# Patient Record
Sex: FEMALE | Race: WHITE | NOT HISPANIC OR LATINO | Employment: OTHER | URBAN - METROPOLITAN AREA
[De-identification: names, ages, dates, MRNs, and addresses within clinical notes are randomized per-mention and may not be internally consistent; named-entity substitution may affect disease eponyms.]

---

## 2020-02-21 ENCOUNTER — OFFICE VISIT (OUTPATIENT)
Dept: NEUROSURGERY | Facility: CLINIC | Age: 60
End: 2020-02-21

## 2020-02-21 ENCOUNTER — PREP FOR SURGERY (OUTPATIENT)
Dept: OTHER | Facility: HOSPITAL | Age: 60
End: 2020-02-21

## 2020-02-21 VITALS — HEIGHT: 66 IN | WEIGHT: 238 LBS | TEMPERATURE: 97.9 F | BODY MASS INDEX: 38.25 KG/M2

## 2020-02-21 DIAGNOSIS — M50.10 HERNIATION OF CERVICAL INTERVERTEBRAL DISC WITH RADICULOPATHY: Primary | ICD-10-CM

## 2020-02-21 DIAGNOSIS — M25.512 ACUTE PAIN OF LEFT SHOULDER: ICD-10-CM

## 2020-02-21 DIAGNOSIS — M50.00 HERNIATED NUCLEUS PULPOSUS WITH MYELOPATHY, CERVICAL: Primary | ICD-10-CM

## 2020-02-21 DIAGNOSIS — M54.12 CERVICAL RADICULOPATHY: ICD-10-CM

## 2020-02-21 PROCEDURE — 99204 OFFICE O/P NEW MOD 45 MIN: CPT | Performed by: NEUROLOGICAL SURGERY

## 2020-02-21 RX ORDER — NAPROXEN 375 MG/1
375 TABLET ORAL 2 TIMES DAILY PRN
COMMUNITY

## 2020-02-21 RX ORDER — HYDROCODONE BITARTRATE AND ACETAMINOPHEN 10; 325 MG/1; MG/1
1 TABLET ORAL 3 TIMES DAILY PRN
Qty: 45 TABLET | Refills: 0 | Status: ON HOLD | OUTPATIENT
Start: 2020-02-21 | End: 2020-03-09 | Stop reason: SDUPTHER

## 2020-02-21 RX ORDER — PROMETHAZINE HYDROCHLORIDE 25 MG/1
25 TABLET ORAL EVERY 6 HOURS PRN
COMMUNITY

## 2020-02-21 RX ORDER — METHOCARBAMOL 500 MG/1
500 TABLET, FILM COATED ORAL EVERY 6 HOURS PRN
COMMUNITY
End: 2020-03-31

## 2020-02-21 NOTE — H&P
Patient: Medina Rider  : 1960     Primary Care Provider: Susie Reeder MD     Requesting Provider: As above           History     Chief Complaint: Neck and left upper extremity pain with sensory alteration.     History of Present Illness: Ms. Rider is a 59-year-old disabled  who describes a 1 year history of neck pain.  At one point she was told she had arthritis in her neck.  At one point she had a injection in her left shoulder for tendinitis and that was helpful last year.  She has had increasing neck stiffness.  She has developed increasing pain that involves her neck and her left shoulder blade and predominantly the left shoulder.  Pain will extend down the arm.  Turning her head to the right will send numbness and pain all the way down to her hand.  She has less bothersome symptoms in her right arm.  Placing her left arm overhead is helpful.  She is worse with lifting.  She denies bowel or bladder dysfunction.     Review of Systems   Constitutional: Negative for activity change, appetite change, chills, diaphoresis, fatigue, fever and unexpected weight change.   HENT: Negative for congestion, dental problem, drooling, ear discharge, ear pain, facial swelling, hearing loss, mouth sores, nosebleeds, postnasal drip, rhinorrhea, sinus pressure, sinus pain, sneezing, sore throat, tinnitus, trouble swallowing and voice change.    Eyes: Negative for photophobia, pain, discharge, redness, itching and visual disturbance.   Respiratory: Positive for apnea. Negative for cough, choking, chest tightness, shortness of breath, wheezing and stridor.    Cardiovascular: Negative for chest pain, palpitations and leg swelling.   Gastrointestinal: Negative for abdominal distention, abdominal pain, anal bleeding, blood in stool, constipation, diarrhea, nausea, rectal pain and vomiting.   Endocrine: Negative for cold intolerance, heat intolerance, polydipsia, polyphagia and polyuria.    Genitourinary: Negative for decreased urine volume, difficulty urinating, dyspareunia, dysuria, enuresis, flank pain, frequency, genital sores, hematuria, menstrual problem, pelvic pain, urgency, vaginal bleeding, vaginal discharge and vaginal pain.   Musculoskeletal: Positive for arthralgias, back pain, myalgias, neck pain and neck stiffness. Negative for gait problem and joint swelling.   Skin: Negative for color change, pallor, rash and wound.   Allergic/Immunologic: Negative for environmental allergies, food allergies and immunocompromised state.   Neurological: Positive for dizziness, weakness, numbness and headaches. Negative for tremors, seizures, syncope, facial asymmetry, speech difficulty and light-headedness.   Hematological: Negative for adenopathy. Does not bruise/bleed easily.   Psychiatric/Behavioral: Negative for agitation, behavioral problems, confusion, decreased concentration, dysphoric mood, hallucinations, self-injury, sleep disturbance and suicidal ideas. The patient is not nervous/anxious and is not hyperactive.          The patient's past medical history, past surgical history, family history, and social history have been reviewed at length in the electronic medical record.     Past Medical History:   Diagnosis Date   • Arthritis    • Bipolar 1 disorder (CMS/HCC)    • Depression    • Diabetes mellitus (CMS/HCC)    • Disease of thyroid gland    • GERD (gastroesophageal reflux disease)    • Hyperlipidemia    • Hypertension    • Kidney stone    • Pneumonia      Past Surgical History:   Procedure Laterality Date   • APPENDECTOMY     •  SECTION     • CHOLECYSTECTOMY     • HYSTERECTOMY       Family History   Problem Relation Age of Onset   • Heart disease Mother    • Diabetes Father    • Heart disease Father    • Heart disease Sister    • Cancer Brother      Social History     Socioeconomic History   • Marital status:      Spouse name: Not on file   • Number of children: Not on  file   • Years of education: Not on file   • Highest education level: Not on file   Tobacco Use   • Smoking status: Never Smoker   • Smokeless tobacco: Never Used   Substance and Sexual Activity   • Alcohol use: No   • Drug use: No   • Sexual activity: Defer       Allergies   Allergen Reactions   • Augmentin [Amoxicillin-Pot Clavulanate] Nausea And Vomiting   • Sulfa Antibiotics        Current Outpatient Medications on File Prior to Visit   Medication Sig Dispense Refill   • ALPRAZolam (XANAX) 1 MG tablet Take 1 mg by mouth 3 (three) times a day as needed for anxiety.     • aspirin 81 MG chewable tablet Chew 81 mg daily.     • estradiol (ESTRACE) 1 MG tablet Take 1 mg by mouth daily.     • famotidine (PEPCID) 10 MG tablet Take 10 mg by mouth 2 (two) times a day.     • gabapentin (NEURONTIN) 800 MG tablet Take 800 mg by mouth 3 (three) times a day.     • HYDROcodone-acetaminophen (NORCO) 7.5-325 MG per tablet Take 1 tablet by mouth every 8 (eight) hours as needed for moderate pain (4-6).     • insulin glargine (LANTUS) 100 UNIT/ML injection Inject 40 Units under the skin every night.     • Insulin Lispro (HUMALOG SC) Inject 80 mg under the skin into the appropriate area as directed Daily.     • lisinopril (PRINIVIL,ZESTRIL) 20 MG tablet Take 20 mg by mouth daily.     • metFORMIN (GLUCOPHAGE) 1000 MG tablet Take 1,000 mg by mouth 2 (two) times a day with meals.     • methocarbamol (ROBAXIN) 500 MG tablet Take 500 mg by mouth 4 (Four) Times a Day.     • naproxen (NAPROSYN) 375 MG tablet Take 375 mg by mouth 2 (Two) Times a Day As Needed for Mild Pain .     • nitrofurantoin (MACRODANTIN) 50 MG capsule Take 50 mg by mouth every night.     • promethazine (PHENERGAN) 25 MG tablet Take 25 mg by mouth Every 6 (Six) Hours As Needed for Nausea or Vomiting.     • sitaGLIPtin (JANUVIA) 100 MG tablet Take 100 mg by mouth daily. Unsure of MG     • venlafaxine XR (EFFEXOR-XR) 150 MG 24 hr capsule Take 150 mg by mouth 2 (two) times  "a day.     • [DISCONTINUED] glipiZIDE (GLUCOTROL) 5 MG tablet Take 5 mg by mouth 2 (two) times a day before meals. UNSURE OF MG     • [DISCONTINUED] HYDROcodone-acetaminophen (NORCO) 5-325 MG per tablet Take 1 tablet by mouth every 6 (six) hours as needed for moderate pain (4-6). 15 tablet 0     No current facility-administered medications on file prior to visit.         Physical Exam:   Temp 97.9 °F (36.6 °C)   Ht 167.6 cm (66\")   Wt 108 kg (238 lb)   BMI 38.41 kg/m²   CONSTITUTIONAL: Patient is well-nourished, pleasant and appears stated age.  CV: Heart regular rate and rhythm without murmur, rub, or gallop.  PULMONARY: Lungs are clear to ascultation.  MUSCULOSKELETAL:  Neck tenderness to palpation is not observed.   ROM in neck is limited in all directions.  She is tender to palpation in the left shoulder joint.  NEUROLOGICAL:  Orientation, memory, attention span, language function, and cognition have been examined and are intact.  Strength is intact in the upper and lower extremities to direct testing.  Muscle tone is normal throughout.  Station and gait are normal.  Sensation is diminished throughout her left upper extremity to light touch testing.  Deep tendon reflexes are 2+ and symmetrical.  Dana's Sign is negative bilaterally. No clonus is elicited.  Coordination is intact.  CRANIAL NERVES:  Cranial Nerve II:  Visual fields are full to confrontation.  Cranial Nerve III, IV, and VI: PERRLADC. Extraocular movements are intact.  Nystagmus is not present.  Cranial Nerve V: Facial sensation is intact to light touch.  Cranial Nerve VII: Muscles of facial expression demonstate no weakness or asymmetry.  Cranial Nerve VIII: Hearing is intact to finger rub bilaterally.  Cranial Nerve IX and X: Palate elevates symmetrically.  Cranial Nerve XI: Shoulder shrug is intact bilaterally.  Cranial Nerve XII: Tongue is midline without evidence of atrophy or fasciculation.        Medical Decision Making     Data Review: "   MRI of the cervical spine dated 2/13/2020 demonstrates disc-osteophyte complexes at C5-6 and C6-7.  This is more prominent leftward at these levels.  There is melissa spinal cord compression.     Diagnosis:   Cervical radiculopathy with spinal cord compression.     Treatment Options:   Given her severe pain and the spinal cord compression I have recommended ACDF at C5-7.  This will likely help with her symptoms although it may not eradicate them.  If she continues to have significant difficulties then we may need to pursue some imaging of her left shoulder.  The nature of the procedure as well as the potential risks, complications, limitations, and alternatives to the procedure were discussed at length with the patient and the patient has agreed to proceed with surgery.          Diagnosis Plan   1. Herniated nucleus pulposus with myelopathy, cervical      2. Cervical radiculopathy      3. Acute pain of left shoulder

## 2020-02-21 NOTE — PROGRESS NOTES
Patient: Medina Rider  : 1960    Primary Care Provider: Susie Reeder MD    Requesting Provider: As above        History    Chief Complaint: Neck and left upper extremity pain with sensory alteration.    History of Present Illness: Ms. Rider is a 59-year-old disabled  who describes a 1 year history of neck pain.  At one point she was told she had arthritis in her neck.  At one point she had a injection in her left shoulder for tendinitis and that was helpful last year.  She has had increasing neck stiffness.  She has developed increasing pain that involves her neck and her left shoulder blade and predominantly the left shoulder.  Pain will extend down the arm.  Turning her head to the right will send numbness and pain all the way down to her hand.  She has less bothersome symptoms in her right arm.  Placing her left arm overhead is helpful.  She is worse with lifting.  She denies bowel or bladder dysfunction.    Review of Systems   Constitutional: Negative for activity change, appetite change, chills, diaphoresis, fatigue, fever and unexpected weight change.   HENT: Negative for congestion, dental problem, drooling, ear discharge, ear pain, facial swelling, hearing loss, mouth sores, nosebleeds, postnasal drip, rhinorrhea, sinus pressure, sinus pain, sneezing, sore throat, tinnitus, trouble swallowing and voice change.    Eyes: Negative for photophobia, pain, discharge, redness, itching and visual disturbance.   Respiratory: Positive for apnea. Negative for cough, choking, chest tightness, shortness of breath, wheezing and stridor.    Cardiovascular: Negative for chest pain, palpitations and leg swelling.   Gastrointestinal: Negative for abdominal distention, abdominal pain, anal bleeding, blood in stool, constipation, diarrhea, nausea, rectal pain and vomiting.   Endocrine: Negative for cold intolerance, heat intolerance, polydipsia, polyphagia and polyuria.   Genitourinary:  "Negative for decreased urine volume, difficulty urinating, dyspareunia, dysuria, enuresis, flank pain, frequency, genital sores, hematuria, menstrual problem, pelvic pain, urgency, vaginal bleeding, vaginal discharge and vaginal pain.   Musculoskeletal: Positive for arthralgias, back pain, myalgias, neck pain and neck stiffness. Negative for gait problem and joint swelling.   Skin: Negative for color change, pallor, rash and wound.   Allergic/Immunologic: Negative for environmental allergies, food allergies and immunocompromised state.   Neurological: Positive for dizziness, weakness, numbness and headaches. Negative for tremors, seizures, syncope, facial asymmetry, speech difficulty and light-headedness.   Hematological: Negative for adenopathy. Does not bruise/bleed easily.   Psychiatric/Behavioral: Negative for agitation, behavioral problems, confusion, decreased concentration, dysphoric mood, hallucinations, self-injury, sleep disturbance and suicidal ideas. The patient is not nervous/anxious and is not hyperactive.        The patient's past medical history, past surgical history, family history, and social history have been reviewed at length in the electronic medical record.    Physical Exam:   Temp 97.9 °F (36.6 °C)   Ht 167.6 cm (66\")   Wt 108 kg (238 lb)   BMI 38.41 kg/m²   CONSTITUTIONAL: Patient is well-nourished, pleasant and appears stated age.  CV: Heart regular rate and rhythm without murmur, rub, or gallop.  PULMONARY: Lungs are clear to ascultation.  MUSCULOSKELETAL:  Neck tenderness to palpation is not observed.   ROM in neck is limited in all directions.  She is tender to palpation in the left shoulder joint.  NEUROLOGICAL:  Orientation, memory, attention span, language function, and cognition have been examined and are intact.  Strength is intact in the upper and lower extremities to direct testing.  Muscle tone is normal throughout.  Station and gait are normal.  Sensation is diminished " throughout her left upper extremity to light touch testing.  Deep tendon reflexes are 2+ and symmetrical.  Dana's Sign is negative bilaterally. No clonus is elicited.  Coordination is intact.  CRANIAL NERVES:  Cranial Nerve II:  Visual fields are full to confrontation.  Cranial Nerve III, IV, and VI: PERRLADC. Extraocular movements are intact.  Nystagmus is not present.  Cranial Nerve V: Facial sensation is intact to light touch.  Cranial Nerve VII: Muscles of facial expression demonstate no weakness or asymmetry.  Cranial Nerve VIII: Hearing is intact to finger rub bilaterally.  Cranial Nerve IX and X: Palate elevates symmetrically.  Cranial Nerve XI: Shoulder shrug is intact bilaterally.  Cranial Nerve XII: Tongue is midline without evidence of atrophy or fasciculation.      Medical Decision Making    Data Review:   MRI of the cervical spine dated 2/13/2020 demonstrates disc-osteophyte complexes at C5-6 and C6-7.  This is more prominent leftward at these levels.  There is melissa spinal cord compression.    Diagnosis:   Cervical radiculopathy with spinal cord compression.    Treatment Options:   Given her severe pain and the spinal cord compression I have recommended ACDF at C5-7.  This will likely help with her symptoms although it may not eradicate them.  If she continues to have significant difficulties then we may need to pursue some imaging of her left shoulder.  The nature of the procedure as well as the potential risks, complications, limitations, and alternatives to the procedure were discussed at length with the patient and the patient has agreed to proceed with surgery.       Diagnosis Plan   1. Herniated nucleus pulposus with myelopathy, cervical     2. Cervical radiculopathy     3. Acute pain of left shoulder         Scribed for Jairo White MD by Adrianna Romeo CMA on 02/21/2020 at 3:27 PM      I, Dr. White, personally performed the services described in the documentation, as scribed in  my presence, and it is both accurate and complete.

## 2020-03-03 ENCOUNTER — APPOINTMENT (OUTPATIENT)
Dept: PREADMISSION TESTING | Facility: HOSPITAL | Age: 60
End: 2020-03-03

## 2020-03-03 VITALS — HEIGHT: 66 IN | BODY MASS INDEX: 37.35 KG/M2 | WEIGHT: 232.4 LBS

## 2020-03-03 LAB
DEPRECATED RDW RBC AUTO: 46.7 FL (ref 37–54)
ERYTHROCYTE [DISTWIDTH] IN BLOOD BY AUTOMATED COUNT: 13.1 % (ref 12.3–15.4)
HBA1C MFR BLD: 8.3 % (ref 4.8–5.6)
HCT VFR BLD AUTO: 42 % (ref 34–46.6)
HGB BLD-MCNC: 13.4 G/DL (ref 12–15.9)
MCH RBC QN AUTO: 30.7 PG (ref 26.6–33)
MCHC RBC AUTO-ENTMCNC: 31.9 G/DL (ref 31.5–35.7)
MCV RBC AUTO: 96.1 FL (ref 79–97)
PLATELET # BLD AUTO: 305 10*3/MM3 (ref 140–450)
PMV BLD AUTO: 11 FL (ref 6–12)
RBC # BLD AUTO: 4.37 10*6/MM3 (ref 3.77–5.28)
WBC NRBC COR # BLD: 9.72 10*3/MM3 (ref 3.4–10.8)

## 2020-03-03 PROCEDURE — 83036 HEMOGLOBIN GLYCOSYLATED A1C: CPT | Performed by: ANESTHESIOLOGY

## 2020-03-03 PROCEDURE — 85027 COMPLETE CBC AUTOMATED: CPT | Performed by: ANESTHESIOLOGY

## 2020-03-03 PROCEDURE — 93010 ELECTROCARDIOGRAM REPORT: CPT | Performed by: INTERNAL MEDICINE

## 2020-03-03 PROCEDURE — 87081 CULTURE SCREEN ONLY: CPT | Performed by: ANESTHESIOLOGY

## 2020-03-03 PROCEDURE — 93005 ELECTROCARDIOGRAM TRACING: CPT

## 2020-03-03 PROCEDURE — 36415 COLL VENOUS BLD VENIPUNCTURE: CPT

## 2020-03-03 RX ORDER — EZETIMIBE 10 MG/1
10 TABLET ORAL DAILY
COMMUNITY

## 2020-03-03 RX ORDER — ACETAMINOPHEN 500 MG
500 TABLET ORAL EVERY 6 HOURS PRN
COMMUNITY

## 2020-03-03 NOTE — PAT
Bactroban and Chlorhexidine Prescription given during PAT visit, as well as written and verbal instructions given to patient during PAT visit.  Patient/family also instructed to complete skin prep checklist and return the checklist on the day of surgery to preoperative staff.  Patient/family verbalized understanding.    Patient to apply Chlorhexadine wipes  to surgical area (as instructed) the night before procedure and the AM of procedure. Wipes provided.    Patient instructed to drink 20 ounces (or until full) of Gatorade and it needs to be completed 1 hour before given arrival time for procedure (NO RED Gatorade)    Patient verbalized understanding.    Per Anesthesia Request, patient instructed not to take their ACE/ARB medications on the AM of surgery.

## 2020-03-05 LAB — MRSA SPEC QL CULT: NORMAL

## 2020-03-09 ENCOUNTER — ANESTHESIA (OUTPATIENT)
Dept: PERIOP | Facility: HOSPITAL | Age: 60
End: 2020-03-09

## 2020-03-09 ENCOUNTER — HOSPITAL ENCOUNTER (OUTPATIENT)
Facility: HOSPITAL | Age: 60
Discharge: HOME | End: 2020-03-09
Attending: NEUROLOGICAL SURGERY | Admitting: NEUROLOGICAL SURGERY

## 2020-03-09 ENCOUNTER — APPOINTMENT (OUTPATIENT)
Dept: GENERAL RADIOLOGY | Facility: HOSPITAL | Age: 60
End: 2020-03-09

## 2020-03-09 ENCOUNTER — ANESTHESIA EVENT (OUTPATIENT)
Dept: PERIOP | Facility: HOSPITAL | Age: 60
End: 2020-03-09

## 2020-03-09 VITALS
DIASTOLIC BLOOD PRESSURE: 82 MMHG | OXYGEN SATURATION: 93 % | SYSTOLIC BLOOD PRESSURE: 168 MMHG | TEMPERATURE: 97.8 F | RESPIRATION RATE: 18 BRPM | HEART RATE: 107 BPM

## 2020-03-09 DIAGNOSIS — M50.10 HERNIATION OF CERVICAL INTERVERTEBRAL DISC WITH RADICULOPATHY: ICD-10-CM

## 2020-03-09 DIAGNOSIS — M50.00 HERNIATED NUCLEUS PULPOSUS WITH MYELOPATHY, CERVICAL: ICD-10-CM

## 2020-03-09 LAB
GLUCOSE BLDC GLUCOMTR-MCNC: 130 MG/DL (ref 70–130)
GLUCOSE BLDC GLUCOMTR-MCNC: 224 MG/DL (ref 70–130)
GLUCOSE BLDC GLUCOMTR-MCNC: 91 MG/DL (ref 70–130)

## 2020-03-09 PROCEDURE — 25010000002 PHENYLEPHRINE PER 1 ML: Performed by: NURSE ANESTHETIST, CERTIFIED REGISTERED

## 2020-03-09 PROCEDURE — 20931 SP BONE ALGRFT STRUCT ADD-ON: CPT | Performed by: NEUROLOGICAL SURGERY

## 2020-03-09 PROCEDURE — C1713 ANCHOR/SCREW BN/BN,TIS/BN: HCPCS | Performed by: NEUROLOGICAL SURGERY

## 2020-03-09 PROCEDURE — 25010000002 MIDAZOLAM PER 1 MG: Performed by: NURSE ANESTHETIST, CERTIFIED REGISTERED

## 2020-03-09 PROCEDURE — 63710000001 HYDROCODONE-ACETAMINOPHEN 10-325 MG TABLET: Performed by: NEUROLOGICAL SURGERY

## 2020-03-09 PROCEDURE — 22551 ARTHRD ANT NTRBDY CERVICAL: CPT | Performed by: NEUROLOGICAL SURGERY

## 2020-03-09 PROCEDURE — 76000 FLUOROSCOPY <1 HR PHYS/QHP: CPT

## 2020-03-09 PROCEDURE — 25010000002 PROPOFOL 10 MG/ML EMULSION: Performed by: NURSE ANESTHETIST, CERTIFIED REGISTERED

## 2020-03-09 PROCEDURE — 25010000002 ONDANSETRON PER 1 MG: Performed by: NURSE ANESTHETIST, CERTIFIED REGISTERED

## 2020-03-09 PROCEDURE — 82962 GLUCOSE BLOOD TEST: CPT

## 2020-03-09 PROCEDURE — 22845 INSERT SPINE FIXATION DEVICE: CPT | Performed by: NEUROLOGICAL SURGERY

## 2020-03-09 PROCEDURE — 22551 ARTHRD ANT NTRBDY CERVICAL: CPT | Performed by: PHYSICIAN ASSISTANT

## 2020-03-09 PROCEDURE — A9270 NON-COVERED ITEM OR SERVICE: HCPCS | Performed by: NEUROLOGICAL SURGERY

## 2020-03-09 PROCEDURE — 25010000002 NEOSTIGMINE 10 MG/10ML SOLUTION: Performed by: NURSE ANESTHETIST, CERTIFIED REGISTERED

## 2020-03-09 PROCEDURE — 25010000002 VANCOMYCIN: Performed by: NEUROLOGICAL SURGERY

## 2020-03-09 PROCEDURE — 25010000002 FENTANYL CITRATE (PF) 100 MCG/2ML SOLUTION: Performed by: NURSE ANESTHETIST, CERTIFIED REGISTERED

## 2020-03-09 PROCEDURE — 22845 INSERT SPINE FIXATION DEVICE: CPT | Performed by: PHYSICIAN ASSISTANT

## 2020-03-09 PROCEDURE — 25010000002 VANCOMYCIN 10 G RECONSTITUTED SOLUTION: Performed by: NEUROLOGICAL SURGERY

## 2020-03-09 PROCEDURE — 25010000002 DEXAMETHASONE SOD PHOS-NACL 10-0.9 MG/50ML-% SOLUTION: Performed by: NEUROLOGICAL SURGERY

## 2020-03-09 PROCEDURE — 25010000002 HYDROMORPHONE 1 MG/ML SOLUTION: Performed by: NURSE ANESTHETIST, CERTIFIED REGISTERED

## 2020-03-09 PROCEDURE — 22552 ARTHRD ANT NTRBD CERVICAL EA: CPT | Performed by: NEUROLOGICAL SURGERY

## 2020-03-09 PROCEDURE — 22552 ARTHRD ANT NTRBD CERVICAL EA: CPT | Performed by: PHYSICIAN ASSISTANT

## 2020-03-09 DEVICE — BONE LORDOTIC ASR 6X14X11 FZD: Type: IMPLANTABLE DEVICE | Site: SPINE CERVICAL | Status: FUNCTIONAL

## 2020-03-09 DEVICE — SCREW 7723513 ZEVO VAR ST 3.5MM X 13MM
Type: IMPLANTABLE DEVICE | Status: FUNCTIONAL
Brand: ZEVO™ ANTERIOR CERVICAL PLATE SYSTEM

## 2020-03-09 DEVICE — BONE LORDOTIC ASR 5X14X11 FZD: Type: IMPLANTABLE DEVICE | Status: FUNCTIONAL

## 2020-03-09 DEVICE — PLATE 3002035 ZEVO 35MM 2 LVL
Type: IMPLANTABLE DEVICE | Status: FUNCTIONAL
Brand: ZEVO™ ANTERIOR CERVICAL PLATE SYSTEM

## 2020-03-09 RX ORDER — HYDROCODONE BITARTRATE AND ACETAMINOPHEN 10; 325 MG/1; MG/1
1 TABLET ORAL 3 TIMES DAILY PRN
Qty: 20 TABLET | Refills: 0 | Status: SHIPPED | OUTPATIENT
Start: 2020-03-09 | End: 2021-07-27

## 2020-03-09 RX ORDER — PROMETHAZINE HYDROCHLORIDE 25 MG/1
25 SUPPOSITORY RECTAL ONCE AS NEEDED
Status: DISCONTINUED | OUTPATIENT
Start: 2020-03-09 | End: 2020-03-09 | Stop reason: HOSPADM

## 2020-03-09 RX ORDER — FENTANYL CITRATE 50 UG/ML
50 INJECTION, SOLUTION INTRAMUSCULAR; INTRAVENOUS
Status: DISCONTINUED | OUTPATIENT
Start: 2020-03-09 | End: 2020-03-09 | Stop reason: HOSPADM

## 2020-03-09 RX ORDER — PROMETHAZINE HYDROCHLORIDE 25 MG/1
25 TABLET ORAL ONCE AS NEEDED
Status: DISCONTINUED | OUTPATIENT
Start: 2020-03-09 | End: 2020-03-09 | Stop reason: HOSPADM

## 2020-03-09 RX ORDER — SODIUM CHLORIDE, SODIUM LACTATE, POTASSIUM CHLORIDE, CALCIUM CHLORIDE 600; 310; 30; 20 MG/100ML; MG/100ML; MG/100ML; MG/100ML
9 INJECTION, SOLUTION INTRAVENOUS CONTINUOUS
Status: DISCONTINUED | OUTPATIENT
Start: 2020-03-09 | End: 2020-03-09 | Stop reason: HOSPADM

## 2020-03-09 RX ORDER — LIDOCAINE HYDROCHLORIDE 10 MG/ML
INJECTION, SOLUTION EPIDURAL; INFILTRATION; INTRACAUDAL; PERINEURAL AS NEEDED
Status: DISCONTINUED | OUTPATIENT
Start: 2020-03-09 | End: 2020-03-09 | Stop reason: SURG

## 2020-03-09 RX ORDER — SODIUM CHLORIDE 0.9 % (FLUSH) 0.9 %
10 SYRINGE (ML) INJECTION EVERY 12 HOURS SCHEDULED
Status: CANCELLED | OUTPATIENT
Start: 2020-03-09

## 2020-03-09 RX ORDER — FAMOTIDINE 10 MG/ML
20 INJECTION, SOLUTION INTRAVENOUS ONCE
Status: CANCELLED | OUTPATIENT
Start: 2020-03-09 | End: 2020-03-09

## 2020-03-09 RX ORDER — SODIUM CHLORIDE 9 MG/ML
INJECTION, SOLUTION INTRAVENOUS AS NEEDED
Status: DISCONTINUED | OUTPATIENT
Start: 2020-03-09 | End: 2020-03-09 | Stop reason: HOSPADM

## 2020-03-09 RX ORDER — MIDAZOLAM HYDROCHLORIDE 1 MG/ML
INJECTION INTRAMUSCULAR; INTRAVENOUS AS NEEDED
Status: DISCONTINUED | OUTPATIENT
Start: 2020-03-09 | End: 2020-03-09 | Stop reason: SURG

## 2020-03-09 RX ORDER — FENTANYL CITRATE 50 UG/ML
INJECTION, SOLUTION INTRAMUSCULAR; INTRAVENOUS AS NEEDED
Status: DISCONTINUED | OUTPATIENT
Start: 2020-03-09 | End: 2020-03-09 | Stop reason: SURG

## 2020-03-09 RX ORDER — PROMETHAZINE HYDROCHLORIDE 25 MG/ML
12.5 INJECTION, SOLUTION INTRAMUSCULAR; INTRAVENOUS ONCE AS NEEDED
Status: DISCONTINUED | OUTPATIENT
Start: 2020-03-09 | End: 2020-03-09 | Stop reason: HOSPADM

## 2020-03-09 RX ORDER — NEOSTIGMINE METHYLSULFATE 1 MG/ML
INJECTION, SOLUTION INTRAVENOUS AS NEEDED
Status: DISCONTINUED | OUTPATIENT
Start: 2020-03-09 | End: 2020-03-09 | Stop reason: SURG

## 2020-03-09 RX ORDER — PROPOFOL 10 MG/ML
VIAL (ML) INTRAVENOUS AS NEEDED
Status: DISCONTINUED | OUTPATIENT
Start: 2020-03-09 | End: 2020-03-09 | Stop reason: SURG

## 2020-03-09 RX ORDER — SODIUM CHLORIDE 0.9 % (FLUSH) 0.9 %
10 SYRINGE (ML) INJECTION AS NEEDED
Status: CANCELLED | OUTPATIENT
Start: 2020-03-09

## 2020-03-09 RX ORDER — ROCURONIUM BROMIDE 10 MG/ML
INJECTION, SOLUTION INTRAVENOUS AS NEEDED
Status: DISCONTINUED | OUTPATIENT
Start: 2020-03-09 | End: 2020-03-09 | Stop reason: SURG

## 2020-03-09 RX ORDER — MAGNESIUM HYDROXIDE 1200 MG/15ML
LIQUID ORAL AS NEEDED
Status: DISCONTINUED | OUTPATIENT
Start: 2020-03-09 | End: 2020-03-09 | Stop reason: HOSPADM

## 2020-03-09 RX ORDER — FAMOTIDINE 20 MG/1
20 TABLET, FILM COATED ORAL ONCE
Status: COMPLETED | OUTPATIENT
Start: 2020-03-09 | End: 2020-03-09

## 2020-03-09 RX ORDER — LIDOCAINE HYDROCHLORIDE 10 MG/ML
0.5 INJECTION, SOLUTION EPIDURAL; INFILTRATION; INTRACAUDAL; PERINEURAL ONCE AS NEEDED
Status: COMPLETED | OUTPATIENT
Start: 2020-03-09 | End: 2020-03-09

## 2020-03-09 RX ORDER — GLYCOPYRROLATE 0.2 MG/ML
INJECTION INTRAMUSCULAR; INTRAVENOUS AS NEEDED
Status: DISCONTINUED | OUTPATIENT
Start: 2020-03-09 | End: 2020-03-09 | Stop reason: SURG

## 2020-03-09 RX ORDER — DEXAMETHASONE IN 0.9 % SOD CHL 10 MG/50ML
10 INTRAVENOUS SOLUTION, PIGGYBACK (ML) INTRAVENOUS
Status: COMPLETED | OUTPATIENT
Start: 2020-03-09 | End: 2020-03-09

## 2020-03-09 RX ORDER — ONDANSETRON 2 MG/ML
INJECTION INTRAMUSCULAR; INTRAVENOUS AS NEEDED
Status: DISCONTINUED | OUTPATIENT
Start: 2020-03-09 | End: 2020-03-09 | Stop reason: SURG

## 2020-03-09 RX ORDER — ONDANSETRON 2 MG/ML
4 INJECTION INTRAMUSCULAR; INTRAVENOUS ONCE AS NEEDED
Status: DISCONTINUED | OUTPATIENT
Start: 2020-03-09 | End: 2020-03-09 | Stop reason: HOSPADM

## 2020-03-09 RX ORDER — HYDROCODONE BITARTRATE AND ACETAMINOPHEN 10; 325 MG/1; MG/1
1 TABLET ORAL ONCE
Status: COMPLETED | OUTPATIENT
Start: 2020-03-09 | End: 2020-03-09

## 2020-03-09 RX ADMIN — DEXAMETHASONE SODIUM PHOSPHATE 10 MG: 10 INJECTION INTRAMUSCULAR; INTRAVENOUS at 07:07

## 2020-03-09 RX ADMIN — FAMOTIDINE 20 MG: 20 TABLET ORAL at 06:20

## 2020-03-09 RX ADMIN — ONDANSETRON 4 MG: 2 INJECTION INTRAMUSCULAR; INTRAVENOUS at 09:46

## 2020-03-09 RX ADMIN — SODIUM CHLORIDE, POTASSIUM CHLORIDE, SODIUM LACTATE AND CALCIUM CHLORIDE 9 ML/HR: 600; 310; 30; 20 INJECTION, SOLUTION INTRAVENOUS at 06:05

## 2020-03-09 RX ADMIN — EPHEDRINE SULFATE 5 MG: 50 INJECTION INTRAMUSCULAR; INTRAVENOUS; SUBCUTANEOUS at 07:35

## 2020-03-09 RX ADMIN — ROCURONIUM BROMIDE 10 MG: 10 INJECTION INTRAVENOUS at 09:01

## 2020-03-09 RX ADMIN — LIDOCAINE HYDROCHLORIDE 50 MG: 10 INJECTION, SOLUTION EPIDURAL; INFILTRATION; INTRACAUDAL; PERINEURAL at 07:01

## 2020-03-09 RX ADMIN — FENTANYL CITRATE 50 MCG: 50 INJECTION INTRAMUSCULAR; INTRAVENOUS at 10:18

## 2020-03-09 RX ADMIN — HYDROCODONE BITARTRATE AND ACETAMINOPHEN 1 TABLET: 10; 325 TABLET ORAL at 11:56

## 2020-03-09 RX ADMIN — NEOSTIGMINE 2.5 MG: 1 INJECTION INTRAVENOUS at 09:46

## 2020-03-09 RX ADMIN — ROCURONIUM BROMIDE 10 MG: 10 INJECTION INTRAVENOUS at 07:30

## 2020-03-09 RX ADMIN — FENTANYL CITRATE 100 MCG: 50 INJECTION, SOLUTION INTRAMUSCULAR; INTRAVENOUS at 07:01

## 2020-03-09 RX ADMIN — FENTANYL CITRATE 50 MCG: 50 INJECTION INTRAMUSCULAR; INTRAVENOUS at 11:04

## 2020-03-09 RX ADMIN — HYDROMORPHONE HYDROCHLORIDE 0.5 MG: 1 INJECTION, SOLUTION INTRAMUSCULAR; INTRAVENOUS; SUBCUTANEOUS at 10:35

## 2020-03-09 RX ADMIN — HYDROMORPHONE HYDROCHLORIDE 0.5 MG: 1 INJECTION, SOLUTION INTRAMUSCULAR; INTRAVENOUS; SUBCUTANEOUS at 10:48

## 2020-03-09 RX ADMIN — FENTANYL CITRATE 50 MCG: 50 INJECTION INTRAMUSCULAR; INTRAVENOUS at 10:56

## 2020-03-09 RX ADMIN — EPHEDRINE SULFATE 10 MG: 50 INJECTION INTRAMUSCULAR; INTRAVENOUS; SUBCUTANEOUS at 07:45

## 2020-03-09 RX ADMIN — VANCOMYCIN HYDROCHLORIDE 1.5 G: 10 INJECTION, POWDER, LYOPHILIZED, FOR SOLUTION INTRAVENOUS at 06:56

## 2020-03-09 RX ADMIN — ROCURONIUM BROMIDE 10 MG: 10 INJECTION INTRAVENOUS at 08:30

## 2020-03-09 RX ADMIN — GLYCOPYRROLATE 0.4 MG: 0.2 INJECTION INTRAMUSCULAR; INTRAVENOUS at 09:46

## 2020-03-09 RX ADMIN — MIDAZOLAM 2 MG: 1 INJECTION INTRAMUSCULAR; INTRAVENOUS at 06:56

## 2020-03-09 RX ADMIN — PROPOFOL 150 MG: 10 INJECTION, EMULSION INTRAVENOUS at 07:01

## 2020-03-09 RX ADMIN — ROCURONIUM BROMIDE 40 MG: 10 INJECTION INTRAVENOUS at 07:01

## 2020-03-09 RX ADMIN — SODIUM CHLORIDE, POTASSIUM CHLORIDE, SODIUM LACTATE AND CALCIUM CHLORIDE: 600; 310; 30; 20 INJECTION, SOLUTION INTRAVENOUS at 09:01

## 2020-03-09 RX ADMIN — FENTANYL CITRATE 50 MCG: 50 INJECTION INTRAMUSCULAR; INTRAVENOUS at 10:25

## 2020-03-09 RX ADMIN — PHENYLEPHRINE HYDROCHLORIDE 0.5 MCG/KG/MIN: 10 INJECTION INTRAVENOUS at 08:10

## 2020-03-09 RX ADMIN — VANCOMYCIN HYDROCHLORIDE 1500 MG: 10 INJECTION, POWDER, LYOPHILIZED, FOR SOLUTION INTRAVENOUS at 06:25

## 2020-03-09 RX ADMIN — EPHEDRINE SULFATE 10 MG: 50 INJECTION INTRAMUSCULAR; INTRAVENOUS; SUBCUTANEOUS at 07:40

## 2020-03-09 RX ADMIN — LIDOCAINE HYDROCHLORIDE 0.2 ML: 10 INJECTION, SOLUTION EPIDURAL; INFILTRATION; INTRACAUDAL; PERINEURAL at 06:05

## 2020-03-09 RX ADMIN — ROCURONIUM BROMIDE 10 MG: 10 INJECTION INTRAVENOUS at 08:00

## 2020-03-09 NOTE — PLAN OF CARE
Problem: Patient Care Overview  Goal: Plan of Care Review  Outcome: Ongoing (interventions implemented as appropriate)  Flowsheets (Taken 3/9/2020 1146)  Progress: no change  Plan of Care Reviewed With: patient  Outcome Summary: patient admitted from pacu, VSS, has voided and ambulated, will be discharged later today after tolerating oral intake and no usual neck swelling

## 2020-03-09 NOTE — ANESTHESIA PREPROCEDURE EVALUATION
Anesthesia Evaluation     NPO Solid Status: > 8 hours  NPO Liquid Status: > 4 hours           Airway   Mallampati: III  TM distance: >3 FB  Neck ROM: full  Possible difficult intubation  Dental - normal exam     Pulmonary    (+) decreased breath sounds,   (-) asthma, not a smoker  Cardiovascular     ECG reviewed  Rhythm: regular  Rate: normal    (+) hypertension,   (-) pacemaker, angina, cardiac stents, CABG      Neuro/Psych  (-) seizures, TIA, CVA  GI/Hepatic/Renal/Endo    (+)   diabetes mellitus (IDDM),   (-) liver disease, no renal disease    Musculoskeletal     Abdominal    Substance History      OB/GYN          Other            Phys Exam Other: Nearly full extension and flexion                Anesthesia Plan    ASA 3     general   (GA  Glidescope in neutral position, no lift)  intravenous induction       Plan discussed with CRNA.

## 2020-03-09 NOTE — OP NOTE
NEUROSURGICAL OPERATIVE NOTE        PREOPERATIVE DIAGNOSIS:    Cervical spondylosis with cord compression and radiculopathy      POSTOPERATIVE DIAGNOSIS:  Same      PROCEDURE:  1.  Arthrodesis at C5-6 and C6-7  2.  Anterior cervical discectomy with microdissection at C5-6 and C6-7  3.  Zevo anterior plating C5-7  4.  Composite allografting C5-6 and C6-7      SURGEON:  Jairo White M.D.      ASSISTANT: Rehana Iniguez PA-C      ANESTHESIA:  General      ESTIMATED BLOOD LOSS: Minimal      SPECIMEN: None      DRAINS: None      COMPLICATIONS:  None      CLINICAL NOTE:  The patient is a 59-year-old woman with a history of neck and bilateral upper extremity pain, left greater than right.  Studies demonstrate spondylosis with cord compromise at C5-6 and C6-7.  Given the above she presents at this time for ACDF C5-7.  The nature of the procedure as well as the potential risks, complications, limitations, and alternatives to the procedure were discussed at length with the patient and the patient has agreed to proceed with surgery.      TECHNICAL NOTE:  The patient was brought to the operating room and placed on the operating room table in the supine position. General endotracheal anesthesia was achieved. A small roll was placed under her shoulders to provide for some very mild extension of her neck. Her anterior neck was prepared and draped in usual fashion. A mildly curved incision was fashioned from the midline rightward approximately 1 fingerwidth below the level of the cricothyroid membrane. Underlying subcutaneous tissues were undermined. The platysma was divided longitudinally. A plane medial to the belly of the sternocleidomastoid muscle was pursued to provide exposure to the anterior spine. Disk space was marked. A localizing radiograph confirmed the operative level. Longus colli muscle was mobilized from the anterior spine with cautery. Self-retaining retractor provided side-to-side exposure. The disk at C6-C7  was incised and evacuated piecemeal with an array of pituitary rongeurs, Svetlana curettes, and Kerrison punches. Distraction screws were utilized. The disk space was very spondylotic. The operating microscope was brought into use. Diskectomy completed. Osteophytes and some disk protrusion were removed. The thickened posterior longitudinal ligament was taken down with Svetlana curettes and Kerrison punches. The neural foramina were widely decompressed. Endplates were decorticated and a small ledge of bone was left posteriorly on each endplate. A 6 mm lordotic composite allograft was impacted into place. The distraction screw at C7 was then placed at C5 and the entire procedure was repeated at that level. This disk space was more narrow. Some soft disc was removed but there was a large osteophyte which was mobilized. This was attached more to the ligament. A small angled probe was utilized to support the spur while angled small curettes were utilized to fracture off and release the spur for delivery and removal. The posterior longitudinal ligament was taken down once again and neural foramina were widely decompressed. At completion of the procedure, an angled micro-ball probe could easily be passed along the nerve roots into the foramina has had been the case at C6-C7. After preparing the endplates once again, a 5 mm lordotic composite allograft was impacted into place. The distraction screws were removed. Bleeding points controlled with bone wax. Anterior osteophytes resected with the drill. A 35 mm Zevo plate was then affixed to the anterior spine from C5 to C7 using 13 mm variable angle screws bilaterally at C5, C6, and C7. Locking cams were engaged at each level. The wound was washed out with an antibiotic-containing solution. Some very small bleeding points were controlled with bipolar cautery. The platysma was reapproximated in interrupted fashion with 3-0 Vicryl suture. The skin was closed in a running  subcuticular fashion with 3-0 Vicryl suture. A dermal sealant was applied. The patient was subsequently extubated and taken to recovery room in satisfactory condition. She did receive preoperative Decadron and antibiotics.            Jairo White M.D.

## 2020-03-09 NOTE — INTERVAL H&P NOTE
Pre-Op H&P (See Recent Office Note Attached for Full H&P)    Chief complaint: Neck and LUE pain with sensory alteration    Review of Systems:  General ROS:  no fever, chills, rashes, No change since last office visit  Cardiovascular ROS: no chest pain or dyspnea on exertion.  History of murmur since childhood.  Neg activity intolerance  Respiratory ROS: no cough, shortness of breath, or wheezing    Meds:    No current facility-administered medications on file prior to encounter.      Current Outpatient Medications on File Prior to Encounter   Medication Sig Dispense Refill   • estradiol (ESTRACE) 2 MG tablet Take 2 mg by mouth Daily.     • famotidine (PEPCID) 10 MG tablet Take 10 mg by mouth 2 (two) times a day.     • gabapentin (NEURONTIN) 800 MG tablet Take 800 mg by mouth 3 (three) times a day.     • insulin glargine (LANTUS) 100 UNIT/ML injection Inject 80 Units under the skin into the appropriate area as directed Every Night.     • Insulin Lispro (HUMALOG SC) Inject  under the skin into the appropriate area as directed 3 (Three) Times a Day. Per sliding scale     • metFORMIN (GLUCOPHAGE) 1000 MG tablet Take 1,000 mg by mouth 2 (two) times a day with meals.     • nitrofurantoin (MACRODANTIN) 50 MG capsule Take 50 mg by mouth every night.     • venlafaxine XR (EFFEXOR-XR) 150 MG 24 hr capsule Take 150 mg by mouth 2 (two) times a day.     • aspirin 81 MG chewable tablet Chew 81 mg daily.     • HYDROcodone-acetaminophen (NORCO) 7.5-325 MG per tablet Take 1 tablet by mouth every 8 (eight) hours as needed for moderate pain (4-6).     • lisinopril (PRINIVIL,ZESTRIL) 40 MG tablet Take 40 mg by mouth Daily.     • methocarbamol (ROBAXIN) 500 MG tablet Take 500 mg by mouth Every 6 (Six) Hours As Needed.     • naproxen (NAPROSYN) 375 MG tablet Take 375 mg by mouth 2 (Two) Times a Day As Needed for Mild Pain .     • promethazine (PHENERGAN) 25 MG tablet Take 25 mg by mouth Every 6 (Six) Hours As Needed for Nausea or  Vomiting.         Vital Signs:  /98 (BP Location: Right arm, Patient Position: Sitting)   Pulse 99   Temp 97.6 °F (36.4 °C) (Temporal)   Resp 18   SpO2 97%   Breastfeeding No     Physical Exam:    CV:  S1S2 regular rate and rhythm, 1-2/6 systolic murmur               Resp:  Clear to auscultation; respirations regular, even and unlabored    Results Review:     Lab Results   Component Value Date    WBC 9.72 03/03/2020    HGB 13.4 03/03/2020    HCT 42.0 03/03/2020    MCV 96.1 03/03/2020     03/03/2020        I reviewed the patient's new clinical results.    Cancer Staging (if applicable)  Cancer Patient: __ yes _x_no __unknown; If yes, clinical stage T:__ N:__M:__, stage group or __N/A    Assessment/Plan:    Diagnosis:   Cervical radiculopathy with spinal cord compression.     Treatment Options:   Given her severe pain and the spinal cord compression I have recommended ACDF at C5-7.  This will likely help with her symptoms although it may not eradicate them.  If she continues to have significant difficulties then we may need to pursue some imaging of her left shoulder.  The nature of the procedure as well as the potential risks, complications, limitations, and alternatives to the procedure were discussed at length with the patient and the patient has agreed to proceed with surgery.    Luciana Gipson, STEVEN  3/9/2020   6:37 AM

## 2020-03-09 NOTE — ANESTHESIA PROCEDURE NOTES
Airway  Urgency: elective    Date/Time: 3/9/2020 7:19 AM  Airway not difficult    General Information and Staff    Patient location during procedure: OR    Indications and Patient Condition  Indications for airway management: airway protection    Preoxygenated: yes  MILS not maintained throughout  Mask difficulty assessment: 1 - vent by mask    Final Airway Details  Final airway type: endotracheal airway      Successful airway: ETT  Cuffed: yes   Successful intubation technique: video laryngoscopy  Facilitating devices/methods: intubating stylet  Endotracheal tube insertion site: oral  Blade: Jud  Blade size: 3  ETT size (mm): 7.5  Cormack-Lehane Classification: grade I - full view of glottis  Placement verified by: chest auscultation and capnometry   Measured from: lips  ETT/EBT  to lips (cm): 20  Number of attempts at approach: 1  Assessment: lips, teeth, and gum same as pre-op and atraumatic intubation    Additional Comments  Pt intubated with glidescope prophylactic for neutral head position during intubation on ACDF Negative epigastric sounds, Breath sound equal bilaterally with symmetric chest rise and fall

## 2020-03-09 NOTE — ANESTHESIA POSTPROCEDURE EVALUATION
Patient: Medina Rider    Procedure Summary     Date:  03/09/20 Room / Location:   [facility] [address] [place] [address] [facility]    Anesthesia Start:  0656 Anesthesia Stop:      Procedure:  CERVICAL DISCECTOMY ANTERIOR WITH FUSION C5-7 (N/A Spine Cervical) Diagnosis:       Herniation of cervical intervertebral disc with radiculopathy      (Herniation of cervical intervertebral disc with radiculopathy [M50.10])    Surgeon:  Jairo White MD Provider:  Britney uW MD    Anesthesia Type:  general ASA Status:  3          Anesthesia Type: general    Vitals  Vitals Value Taken Time   /91 3/9/2020 10:00 AM   Temp     Pulse 113 3/9/2020 10:02 AM   Resp     SpO2 90 % 3/9/2020 10:02 AM   Vitals shown include unvalidated device data.        Post Anesthesia Care and Evaluation    Patient location during evaluation: PACU  Patient participation: complete - patient participated  Level of consciousness: awake and alert  Pain score: 0  Pain management: adequate  Airway patency: patent  Anesthetic complications: No anesthetic complications  PONV Status: none  Cardiovascular status: hemodynamically stable and acceptable  Respiratory status: nonlabored ventilation, acceptable and nasal cannula  Hydration status: acceptable    Comments: 94%, rr12, 97.1 112 hr, 161/85

## 2020-03-13 ENCOUNTER — TELEPHONE (OUTPATIENT)
Dept: NEUROSURGERY | Facility: CLINIC | Age: 60
End: 2020-03-13

## 2020-03-13 NOTE — TELEPHONE ENCOUNTER
Provider:  Christopher  Caller: patient  Time of call:   11:58  Phone #:  [phone]  Surgery:  ACDF  Surgery Date:  03/09/20  Last visit:   same  Next visit: None    JAZMÍN:         Reason for call:     Patient states that the night before last she started experiencing extreme pain in her Left shoulder and LUE.  She has been taking Norco and Naproxen with little relief.  Patient was crying.  Her incision looks good, however there is little swelling, no drainage.  She states it's the same pain she had before surgery.    New-She has right thumb pain/tingling middle and index finger.

## 2020-03-13 NOTE — TELEPHONE ENCOUNTER
Medication phoned in and patient notified.  She was thankful for the help.    She will call us on Monday with an update.

## 2020-03-16 RX ORDER — CYCLOBENZAPRINE HCL 10 MG
10 TABLET ORAL 3 TIMES DAILY PRN
Qty: 60 TABLET | Refills: 1 | Status: SHIPPED | OUTPATIENT
Start: 2020-03-16 | End: 2020-03-31 | Stop reason: SDUPTHER

## 2020-03-16 NOTE — TELEPHONE ENCOUNTER
"Patient called with an update.\" The steroids are not helping. Pain seems to be not too bad in neck, but I still have pain in my arm and it runs up into my shoulder. I have 2 days of pain medicine left.The pain is almost like before I had surgery. I cant handle it.\" She had cervical discectomy on 3-9. She gave me a phone number that is not in system. [phone] was the number she gave me.  "

## 2020-03-16 NOTE — TELEPHONE ENCOUNTER
Called patient. She states the pain in her LUE is unchanged and severe. No weakness. Incision is clean, dry and intact. I recommended she change to flexeril TID and add tylenol 1000 mg TID for breakthrough pain. She has 1-2 days left of norco. I advised her to let us know when she is out and we can call Tramadol in for her. I discussed general restrictions she should be following.

## 2020-03-23 RX ORDER — TRAMADOL HYDROCHLORIDE 50 MG/1
50 TABLET ORAL EVERY 8 HOURS PRN
Qty: 30 TABLET | Refills: 0 | OUTPATIENT
Start: 2020-03-23 | End: 2020-06-12 | Stop reason: SDUPTHER

## 2020-03-23 NOTE — TELEPHONE ENCOUNTER
Tramadol called in to pt's pharmacy. (Please sign to reflect in chart)    Called pt to advise, no answer, no option for VM

## 2020-03-23 NOTE — TELEPHONE ENCOUNTER
Provider:  Christopher  Caller: Patient   Time of call: 11:59am    Phone #:  [phone]  Surgery:   ACDF C5-7  Surgery Date:  03/09/2020  Last visit:   02/21/2020  Next visit: MARIBETH NOYOLA:         Reason for call:  Patient called LVM stating her arm is still hurting her really bad. Patient stated she completed the steroid pack, and that did not help her. Patient is wanting to know if tramadol can called in for her, to help with her pain level.

## 2020-03-27 ENCOUNTER — TELEPHONE (OUTPATIENT)
Dept: NEUROSURGERY | Facility: CLINIC | Age: 60
End: 2020-03-27

## 2020-03-27 NOTE — TELEPHONE ENCOUNTER
PT CALLING WANTS TO CX HER PO TODAY DUE TO COVID19. SHE IS AFRAID AND WOULD LIKE A CALL BACK AT THIS NUMBER [phone].  PLEASE ADVISE ME AS TO WHEN TO CINDY.  THANKS JOSE

## 2020-03-31 ENCOUNTER — TELEMEDICINE (OUTPATIENT)
Dept: NEUROSURGERY | Facility: CLINIC | Age: 60
End: 2020-03-31

## 2020-03-31 DIAGNOSIS — M50.00 HERNIATED NUCLEUS PULPOSUS WITH MYELOPATHY, CERVICAL: ICD-10-CM

## 2020-03-31 PROCEDURE — 99024 POSTOP FOLLOW-UP VISIT: CPT | Performed by: PHYSICIAN ASSISTANT

## 2020-03-31 RX ORDER — CYCLOBENZAPRINE HCL 10 MG
10 TABLET ORAL 3 TIMES DAILY PRN
Qty: 60 TABLET | Refills: 1 | Status: SHIPPED | OUTPATIENT
Start: 2020-03-31 | End: 2020-06-12 | Stop reason: SDUPTHER

## 2020-03-31 RX ORDER — HYDROCODONE BITARTRATE AND ACETAMINOPHEN 7.5; 325 MG/1; MG/1
1 TABLET ORAL EVERY 8 HOURS PRN
Qty: 25 TABLET | Refills: 0 | Status: ON HOLD | OUTPATIENT
Start: 2020-03-31 | End: 2021-06-07 | Stop reason: SDUPTHER

## 2020-03-31 NOTE — PROGRESS NOTES
Patient: Medina Rider  : 1960  Chart #: [id number]    Date of Service: 2020    CHIEF COMPLAINT: Cervical spondylosis with cord compression and radiculopathy    History of Present Illness Ms. Rider is a 59-year-old woman with a history of neck and bilateral upper extremity pain, left greater than right.  Her studies demonstrated spondylosis with cord compromise at C5-6 and C6-7.  As such on 3/9/2020 she underwent ACDF with allografting and plating C5-7.  Surgery was without overt intraoperative complication.    Today patient is 3 weeks postop.  She called our office a couple times complaining of increased pain into her arms after surgery.  A Medrol Dosepak was prescribed.  I spoke with her via video chat today and she has been doing better this week.  She does continue with some posterior neck pain that radiates into the left shoulder with some milder symptoms further distally.  She continues with some lowgrade right-sided symptoms.  She denies incisional issue.  She denies new or progressive symptoms.  She has been taking tramadol and Flexeril and has requested a refill of her [place].      Past Medical History:   Diagnosis Date   • Anemia    • Anesthesia complication     elevated BP after surgeries   • Anxiety and depression    • Arthritis    • Bipolar 1 disorder (CMS/[facility]ly [facility])    • Diabetes mellitus (CMS/Formerly [facility])    • Disease of thyroid gland     nodule on L lobe, PCP monitoring   • GERD (gastroesophageal reflux disease)    • Heart murmur    • History of kidney stones    • Hyperlipidemia    • Hypertension    • Panic attacks    • Pneumonia     last incident 2020   • Rheumatic fever     as a child    • Sleep apnea     wears CPAP sometimes    • UTI (urinary tract infection)     last one 6 months ago         Current Outpatient Medications:   •  acetaminophen (TYLENOL) 500 MG tablet, Take 500 mg by mouth Every 6 (Six) Hours As Needed for Mild Pain ., Disp: , Rfl:   •  aspirin 81 MG chewable tablet,  Chew 81 mg daily., Disp: , Rfl:   •  cyclobenzaprine (FLEXERIL) 10 MG tablet, Take 1 tablet by mouth 3 (Three) Times a Day As Needed for Muscle Spasms., Disp: 60 tablet, Rfl: 1  •  estradiol (ESTRACE) 2 MG tablet, Take 2 mg by mouth Daily., Disp: , Rfl:   •  ezetimibe (ZETIA) 10 MG tablet, Take 10 mg by mouth Daily., Disp: , Rfl:   •  famotidine (PEPCID) 10 MG tablet, Take 10 mg by mouth 2 (two) times a day., Disp: , Rfl:   •  gabapentin (NEURONTIN) 800 MG tablet, Take 800 mg by mouth 3 (three) times a day., Disp: , Rfl:   •  HYDROcodone-acetaminophen (NORCO)  MG per tablet, Take 1 tablet by mouth 3 (Three) Times a Day As Needed for Moderate Pain ., Disp: 20 tablet, Rfl: 0  •  HYDROcodone-acetaminophen (NORCO) 7.5-325 MG per tablet, Take 1 tablet by mouth Every 8 (Eight) Hours As Needed for Moderate Pain ., Disp: 25 tablet, Rfl: 0  •  insulin glargine (LANTUS) 100 UNIT/ML injection, Inject 80 Units under the skin into the appropriate area as directed Every Night., Disp: , Rfl:   •  Insulin Lispro (HUMALOG SC), Inject  under the skin into the appropriate area as directed 3 (Three) Times a Day. Per sliding scale, Disp: , Rfl:   •  linaclotide (LINZESS) 145 MCG capsule capsule, Take 145 mcg by mouth Every Morning Before Breakfast., Disp: , Rfl:   •  lisinopril (PRINIVIL,ZESTRIL) 40 MG tablet, Take 40 mg by mouth Daily., Disp: , Rfl:   •  metFORMIN (GLUCOPHAGE) 1000 MG tablet, Take 1,000 mg by mouth 2 (two) times a day with meals., Disp: , Rfl:   •  naproxen (NAPROSYN) 375 MG tablet, Take 375 mg by mouth 2 (Two) Times a Day As Needed for Mild Pain ., Disp: , Rfl:   •  nitrofurantoin (MACRODANTIN) 50 MG capsule, Take 50 mg by mouth every night., Disp: , Rfl:   •  promethazine (PHENERGAN) 25 MG tablet, Take 25 mg by mouth Every 6 (Six) Hours As Needed for Nausea or Vomiting., Disp: , Rfl:   •  traMADol (ULTRAM) 50 MG tablet, Take 1 tablet by mouth Every 8 (Eight) Hours As Needed for Moderate Pain ., Disp: 30 tablet,  Rfl: 0  •  venlafaxine XR (EFFEXOR-XR) 150 MG 24 hr capsule, Take 150 mg by mouth 2 (two) times a day., Disp: , Rfl:     Past Surgical History:   Procedure Laterality Date   • ANTERIOR CERVICAL DISCECTOMY W/ FUSION N/A 3/9/2020    Procedure: CERVICAL DISCECTOMY ANTERIOR WITH FUSION C5-7;  Surgeon: Jairo White MD;  [facility];  Service: Neurosurgery;  Laterality: N/A;   • APPENDECTOMY     •  SECTION      x4   • CHOLECYSTECTOMY     • HEMORRHOIDECTOMY     • HYSTERECTOMY     • TONSILLECTOMY         Social History     Socioeconomic History   • Marital status:      Spouse name: Not on file   • Number of children: Not on file   • Years of education: Not on file   • Highest education level: Not on file   Tobacco Use   • Smoking status: Never Smoker   • Smokeless tobacco: Never Used   Substance and Sexual Activity   • Alcohol use: No   • Drug use: No   • Sexual activity: Defer         Review of Systems   Genitourinary: Negative for difficulty urinating.   Musculoskeletal: Positive for neck pain and neck stiffness. Negative for gait problem.   Neurological: Positive for weakness and numbness.   All other systems reviewed and are negative.      Objective   Vital Signs: not currently breastfeeding.  Physical Exam  Patient appears comfortable.  She has going ROM of her upper extremities.  From what I could visualize of her anterior neck incision it appears to be intact and healing nicely.      Assessment/Plan   Diagnosis: Cervical spondylosis with cord compression and radiculopathy status post ACDF C5-7    Medical Decision Making: Ms. Rider is coming along.  The first couple weeks postop were a bit rough but she is finally turning the corner.  Overall she is doing much better than she was prior to surgery.  I reassured her. This is going to take some time.  If she continues with significant left shoulder pain down the road, we may consider imaging her shoulder.  I have renewed her Norco today  and stepped down in strength.  We discussed activity limitations and restrictions as she slowly advances her activities.      She will follow-up with Dr. White in about 6 to 8 weeks.  I would like her to obtain some plain films of the cervical spine prior to that appointment.  She may call our office in the interim if she has any questions or concerns.      This was an audio and video enabled telemedicine encounter.        Medina was seen today for post-op.    Diagnoses and all orders for this visit:    Herniated nucleus pulposus with myelopathy, cervical  -     HYDROcodone-acetaminophen (NORCO) 7.5-325 MG per tablet; Take 1 tablet by mouth Every 8 (Eight) Hours As Needed for Moderate Pain .  -     XR Spine Cervical 2 View; Future    Other orders  -     cyclobenzaprine (FLEXERIL) 10 MG tablet; Take 1 tablet by mouth 3 (Three) Times a Day As Needed for Muscle Spasms.               Dianne Dickinson PA-C  Patient Care Team:  Susie Reeder MD as PCP - General (Internal Medicine)  [place], STEVEN Gallardo as Referring Physician (Family Medicine)

## 2020-05-14 ENCOUNTER — TELEPHONE (OUTPATIENT)
Dept: NEUROSURGERY | Facility: CLINIC | Age: 60
End: 2020-05-14

## 2020-05-14 NOTE — TELEPHONE ENCOUNTER
PATIENT NEEDS AN ORDER FOR AN XR SENT [facility] IN [facility] TO  COMPLETE PRIOR TO OV.    [phone]

## 2020-05-18 ENCOUNTER — TELEPHONE (OUTPATIENT)
Dept: NEUROSURGERY | Facility: CLINIC | Age: 60
End: 2020-05-18

## 2020-05-18 DIAGNOSIS — Z98.1 S/P CERVICAL SPINAL FUSION: Primary | ICD-10-CM

## 2020-05-20 ENCOUNTER — OFFICE VISIT (OUTPATIENT)
Dept: NEUROSURGERY | Facility: CLINIC | Age: 60
End: 2020-05-20

## 2020-05-20 VITALS — BODY MASS INDEX: 37.28 KG/M2 | HEIGHT: 66 IN | TEMPERATURE: 96.9 F | WEIGHT: 232 LBS

## 2020-05-20 DIAGNOSIS — M50.00 HERNIATED NUCLEUS PULPOSUS WITH MYELOPATHY, CERVICAL: ICD-10-CM

## 2020-05-20 DIAGNOSIS — M54.12 CERVICAL RADICULOPATHY: ICD-10-CM

## 2020-05-20 DIAGNOSIS — Z98.1 S/P CERVICAL SPINAL FUSION: Primary | ICD-10-CM

## 2020-05-20 PROCEDURE — 99024 POSTOP FOLLOW-UP VISIT: CPT | Performed by: NEUROLOGICAL SURGERY

## 2020-05-20 NOTE — PROGRESS NOTES
Patient: Medina Rider  : 1960    Primary Care Provider: Susie Reeder MD    Requesting Provider: As above        History    Chief Complaint: Neck and bilateral upper extremity pain.    History of Present Illness: Ms. Rider is a 59-year-old woman with a history of neck and bilateral upper extremity pain, left greater than right.  Her studies demonstrated spondylosis with cord compromise at C5-6 and C6-7.  As such on 3/9/2020 she underwent ACDF with allografting and plating C5-7.  Surgery was without overt intraoperative complication.  Early on she had a fair amount of symptoms that have settled down.  She still has a little bit of neck discomfort.  Her arm symptoms are dramatically better.  She is currently caring for her 9-month-old grandchild.  She complains of chronic low back pain and apparently has degenerative disc disease.       Review of Systems   Constitutional: Negative for activity change, appetite change, chills, diaphoresis, fatigue, fever and unexpected weight change.   HENT: Negative for congestion, dental problem, drooling, ear discharge, ear pain, facial swelling, hearing loss, mouth sores, nosebleeds, postnasal drip, rhinorrhea, sinus pressure, sneezing, sore throat, tinnitus, trouble swallowing and voice change.    Eyes: Negative for photophobia, pain, discharge, redness, itching and visual disturbance.   Respiratory: Negative for apnea, cough, choking, chest tightness, shortness of breath, wheezing and stridor.    Cardiovascular: Negative for chest pain, palpitations and leg swelling.   Gastrointestinal: Negative for abdominal distention, abdominal pain, anal bleeding, blood in stool, constipation, diarrhea, nausea, rectal pain and vomiting.   Endocrine: Negative for cold intolerance, heat intolerance, polydipsia, polyphagia and polyuria.   Genitourinary: Negative for decreased urine volume, difficulty urinating, dysuria, enuresis, flank pain, frequency, genital sores,  "hematuria and urgency.   Musculoskeletal: Positive for neck pain and neck stiffness. Negative for arthralgias, back pain, gait problem, joint swelling and myalgias.   Skin: Negative for color change, pallor, rash and wound.   Allergic/Immunologic: Negative for environmental allergies, food allergies and immunocompromised state.   Neurological: Positive for weakness and numbness. Negative for dizziness, tremors, seizures, syncope, facial asymmetry, speech difficulty, light-headedness and headaches.   Hematological: Negative for adenopathy. Does not bruise/bleed easily.   Psychiatric/Behavioral: Negative for agitation, behavioral problems, confusion, decreased concentration, dysphoric mood, hallucinations, self-injury, sleep disturbance and suicidal ideas. The patient is not nervous/anxious and is not hyperactive.    All other systems reviewed and are negative.      The patient's past medical history, past surgical history, family history, and social history have been reviewed at length in the electronic medical record.    Physical Exam:   Temp 96.9 °F (36.1 °C) (Temporal)   Ht 167.6 cm (66\")   Wt 105 kg (232 lb)   BMI 37.45 kg/m²   The patient appears quite comfortable.  Her right anterior cervical incision looks wonderful.    Medical Decision Making    Data Review:   Plain films of the cervical spine from previous visit demonstrate good position of her construct from C5-7.    Diagnosis:   Cervical spondylosis with radiculopathy status post ACDF C5-7.    Treatment Options:   Ms. Rider is doing well.  She will follow-up in 6 months with new plain films of the cervical spine.  At that time she will bring some of her old lumbar studies for our review.       Diagnosis Plan   1. S/P cervical spinal fusion  XR Spine Cervical 2 or 3 View   2. Herniated nucleus pulposus with myelopathy, cervical     3. Cervical radiculopathy         Scribed for Jairo White MD by Adrianna Romeo CMA on 05/20/2020 at 11:32 " AM      I, Dr. White, personally performed the services described in the documentation, as scribed in my presence, and it is both accurate and complete.

## 2020-05-27 ENCOUNTER — TELEPHONE (OUTPATIENT)
Dept: NEUROSURGERY | Facility: CLINIC | Age: 60
End: 2020-05-27

## 2020-05-27 NOTE — TELEPHONE ENCOUNTER
Patients wants to know if its ok for her to ride rides and water rides at an amusement park. . Wants to also if she can get different pain medication.   Please call patient back at [phone]

## 2020-05-28 NOTE — TELEPHONE ENCOUNTER
"I spoke with Dr. White.   - he thinks it is appropriate for her to ride some rides (just nothing too \"wild\")    What medication does she want to switch to? We can't go higher than 7.5's  "

## 2020-06-12 DIAGNOSIS — Z98.1 S/P CERVICAL SPINAL FUSION: Primary | ICD-10-CM

## 2020-06-12 DIAGNOSIS — M50.00 HERNIATED NUCLEUS PULPOSUS WITH MYELOPATHY, CERVICAL: ICD-10-CM

## 2020-06-12 RX ORDER — CYCLOBENZAPRINE HCL 10 MG
10 TABLET ORAL 3 TIMES DAILY PRN
Qty: 60 TABLET | Refills: 0 | Status: SHIPPED | OUTPATIENT
Start: 2020-06-12 | End: 2020-11-09

## 2020-06-12 RX ORDER — TRAMADOL HYDROCHLORIDE 50 MG/1
50 TABLET ORAL 2 TIMES DAILY PRN
Qty: 20 TABLET | Refills: 0 | OUTPATIENT
Start: 2020-06-12 | End: 2020-12-23 | Stop reason: SDUPTHER

## 2020-06-12 NOTE — TELEPHONE ENCOUNTER
Provider:  Christopher  Caller: Medina   Phone #: [phone]   Surgery:  C5-7 ACDF  Surgery Date:  3/9/2020  Last visit:   5/20/2020  Next visit: 11/18/2020    Reason for call:     Spoke w/ pt. Pt states she is having a little bit of discomfort in her neck and left shoulder. No radicular pain down the arm. Pt also states she has occasional numbness in the first 3 digits in both hands. Pt admits that she has been taking care of her grandson and picking him up. I let her know that this sounds a little bit like a flare up. To take it easy over the weekend, no heavy lifting, use heat and ice and call us back next week with an update.     I told her I would get back with her today if there is anything else we need to do.     Pt also requesting a refill on her muscle relaxer and tramadol for breakthrough pain.

## 2020-08-17 ENCOUNTER — TELEPHONE (OUTPATIENT)
Dept: NEUROSURGERY | Facility: CLINIC | Age: 60
End: 2020-08-17

## 2020-08-17 RX ORDER — METHYLPREDNISOLONE 4 MG/1
TABLET ORAL
Qty: 21 TABLET | Refills: 0 | Status: SHIPPED | OUTPATIENT
Start: 2020-08-17 | End: 2021-06-07 | Stop reason: HOSPADM

## 2020-08-17 NOTE — TELEPHONE ENCOUNTER
Please have her take a medrol dose pack and then call the office once complete  - I have signed and sent to pharmacy

## 2020-08-17 NOTE — TELEPHONE ENCOUNTER
"Provider:  Christopher  Caller: Patient  Time of call:   3:15call back  Phone #:  [phone]  Surgery:  ACDF C5  Surgery Date:  03/09/2020  Last visit:   05/20/2020  Next visit: 11/18/2020    JAZMÍN:         Reason for call:    Patient called stating she is having a severe pain in the left side of her neck, that is radiating down into her left shoulder, and arm. Patient also stated the pain is \"Shooting up into my head. I can feel it tingling on the front right side of my head toward the temple.\" Patient also noted having dizziness, and balance issues. Patient stated she has been taking tylenol and naproxen for the pain, and those do not really help her. Patient stated she also has flexeril 10mg, and this does not help her either. Denies vision changes.         "

## 2020-08-19 ENCOUNTER — TELEPHONE (OUTPATIENT)
Dept: NEUROSURGERY | Facility: CLINIC | Age: 60
End: 2020-08-19

## 2020-08-19 NOTE — TELEPHONE ENCOUNTER
Provider: DR. RAJAN  Caller: PATIENT  Reason for Call: PATIENT CALLED REQUESTING MEDICAL ADVICE & FURTHER INSTRUCTION REGARDING A FALL THAT OCCURRED WHEN PATIENT'S CHAIR BROKE. PATIENT INQUIRING IF ANY IMAGING NEEDS TO BE COMPLETED SINCE THIS OCCURRED & WHAT ADDITIONAL STEPS ARE AVAILABLE. PATIENT PREFERS TO HAVE ANY IMAGING COMPLETED AT [facility]. PATIENT HAS AN UPCOMING APPT IN NOV.   When was the patient last seen: 05/20/20  When did it start: THIS MORNING   Where is it located: NECK, SHOULDER, L-ARM/HAND  Characteristics of symptom/severity: INTENSE PAIN  Timing- Is it constant or intermittent: CONSTANT  What makes it worse: TURN NECK  What therapies/medications have you tried: MEDOL PACK, TYLENOL      [phone]

## 2020-08-21 NOTE — TELEPHONE ENCOUNTER
Left another message for the patient to call to schedule.  My direct ext was given for today but if she is unable to call today she was given Linda's number for call back next week.

## 2020-08-31 NOTE — TELEPHONE ENCOUNTER
Third attempt made and another message left.  Please advise if we should continue to call or let her reach out to us.

## 2020-11-09 RX ORDER — CYCLOBENZAPRINE HCL 10 MG
TABLET ORAL
Qty: 60 TABLET | Refills: 0 | Status: SHIPPED | OUTPATIENT
Start: 2020-11-09 | End: 2020-12-04

## 2020-11-13 ENCOUNTER — TELEPHONE (OUTPATIENT)
Dept: NEUROSURGERY | Facility: CLINIC | Age: 60
End: 2020-11-13

## 2020-11-13 NOTE — TELEPHONE ENCOUNTER
Provider:  Christopher  Caller: patient  Time of call:  3:25   Phone #:  [phone]  Surgery:  ACDF  Surgery Date:  03/09/20  Last visit:   05/20/20  Next visit: 11/18/20    JAZMÍN:         Reason for call:     Patient called and asked if she could have her x-ray done today at [facility] in [facility].  Order was faxed and patient notified to bring the disc with her.    She has an array of issues.  Her thumb and 2 adjoining digits are numb and tingle at times.  She complains of bilateral shoulder pain, and has several other issues, such as a feelings of bugs crawling in her head.    Patient advised to write all her concerns down so she could present them to Gogo CAMARGO next Wednesday.  I told her to take it easy and she can use ice 20 min at a time for shoulder pain.

## 2020-11-18 NOTE — TELEPHONE ENCOUNTER
She can use OTC ibuprofen or tylenol until FU   - if she is seeking something stronger, she needs to contact her PCP's office

## 2020-11-18 NOTE — TELEPHONE ENCOUNTER
"Patient had to cancel today's appointment. She stated she is not feeling well, thinks she has a head cold. She requested a Friday appointment. She cannot do this Friday, 11/20, due to having another appointment that day. Office is closed 11/27, so she took next available, 12/4.    She is requesting something for pain until her follow up. She is having a sensation on her head like \"bugs are crawling\" and pain at the base of her skull that goes down in her arms and down her back.    Please call patient to advise if anything can be provided. Call back number is [phone].  "

## 2020-12-04 RX ORDER — CYCLOBENZAPRINE HCL 10 MG
TABLET ORAL
Qty: 60 TABLET | Refills: 0 | Status: SHIPPED | OUTPATIENT
Start: 2020-12-04 | End: 2020-12-23

## 2020-12-18 ENCOUNTER — OFFICE VISIT (OUTPATIENT)
Dept: NEUROSURGERY | Facility: CLINIC | Age: 60
End: 2020-12-18

## 2020-12-18 VITALS
HEIGHT: 66 IN | TEMPERATURE: 97.7 F | SYSTOLIC BLOOD PRESSURE: 146 MMHG | WEIGHT: 236 LBS | DIASTOLIC BLOOD PRESSURE: 66 MMHG | BODY MASS INDEX: 37.93 KG/M2

## 2020-12-18 DIAGNOSIS — R20.0 BILATERAL HAND NUMBNESS: ICD-10-CM

## 2020-12-18 DIAGNOSIS — M54.12 CERVICAL RADICULOPATHY: ICD-10-CM

## 2020-12-18 DIAGNOSIS — Z98.1 S/P CERVICAL SPINAL FUSION: Primary | ICD-10-CM

## 2020-12-18 DIAGNOSIS — M50.00 HERNIATED NUCLEUS PULPOSUS WITH MYELOPATHY, CERVICAL: ICD-10-CM

## 2020-12-18 DIAGNOSIS — M25.512 ACUTE PAIN OF LEFT SHOULDER: ICD-10-CM

## 2020-12-18 DIAGNOSIS — M48.062 SPINAL STENOSIS, LUMBAR REGION, WITH NEUROGENIC CLAUDICATION: ICD-10-CM

## 2020-12-18 PROCEDURE — 99214 OFFICE O/P EST MOD 30 MIN: CPT | Performed by: PHYSICIAN ASSISTANT

## 2020-12-18 NOTE — PROGRESS NOTES
Patient: Medina Rider  : 1960  Chart #: [id number]    Date of Service: 2020    CHIEF COMPLAINT:  1.   Neck and left shoulder pain  2.   Bilateral hand numbness   3.   Low back pain with walking and standing intolerance.     History of Present Illness Ms. Rider is seen in follow-up.  She is a 60-year-old woman who presented to our clinic with neck and bilateral upper extremity pain, left greater than right.  Her studies demonstrated spondylosis with cord compromise.  On 3/9/2020 she underwent ACDF with allografting and plating C5-7.  Her progress has been slow.  Over the past 4 months she has had more neck pain radiating to the left shoulder. Her shoulder mainly bothers her when lying on that side at night.  She also complains of numbness in the median nerve distribution bilaterally-worse with activity/overuse.     Patient also complains of ongoing low back pain with radiation into the lower extremities.  Symptoms are much worse with prolonged standing and walking.  She is having trouble standing for more than 10 minutes to cook a meal. Symptoms are alleviated when sitting.      Past Medical History:   Diagnosis Date   • Anemia    • Anesthesia complication     elevated BP after surgeries   • Anxiety and depression    • Arthritis    • Bipolar 1 disorder (CMS/HCC)    • Diabetes mellitus (CMS/HCC)    • Disease of thyroid gland     nodule on L lobe, PCP monitoring   • GERD (gastroesophageal reflux disease)    • Heart murmur    • History of kidney stones    • Hyperlipidemia    • Hypertension    • Panic attacks    • Pneumonia     last incident 2020   • Rheumatic fever     as a child    • Sleep apnea     wears CPAP sometimes    • UTI (urinary tract infection)     last one 6 months ago         Current Outpatient Medications:   •  acetaminophen (TYLENOL) 500 MG tablet, Take 500 mg by mouth Every 6 (Six) Hours As Needed for Mild Pain ., Disp: , Rfl:   •  aspirin 81 MG chewable tablet, Chew 81 mg  daily., Disp: , Rfl:   •  cyclobenzaprine (FLEXERIL) 10 MG tablet, TAKE 1 TABLET BY MOUTH THREE TIMES A DAY AS NEEDED FOR MUSCLE SPASMS, Disp: 60 tablet, Rfl: 0  •  estradiol (ESTRACE) 2 MG tablet, Take 2 mg by mouth Daily., Disp: , Rfl:   •  ezetimibe (ZETIA) 10 MG tablet, Take 10 mg by mouth Daily., Disp: , Rfl:   •  famotidine (PEPCID) 10 MG tablet, Take 10 mg by mouth 2 (two) times a day., Disp: , Rfl:   •  gabapentin (NEURONTIN) 800 MG tablet, Take 800 mg by mouth 3 (three) times a day., Disp: , Rfl:   •  HYDROcodone-acetaminophen (NORCO)  MG per tablet, Take 1 tablet by mouth 3 (Three) Times a Day As Needed for Moderate Pain ., Disp: 20 tablet, Rfl: 0  •  HYDROcodone-acetaminophen (NORCO) 7.5-325 MG per tablet, Take 1 tablet by mouth Every 8 (Eight) Hours As Needed for Moderate Pain ., Disp: 25 tablet, Rfl: 0  •  insulin glargine (LANTUS) 100 UNIT/ML injection, Inject 80 Units under the skin into the appropriate area as directed Every Night., Disp: , Rfl:   •  Insulin Lispro (HUMALOG SC), Inject  under the skin into the appropriate area as directed 3 (Three) Times a Day. Per sliding scale, Disp: , Rfl:   •  linaclotide (LINZESS) 145 MCG capsule capsule, Take 145 mcg by mouth Every Morning Before Breakfast., Disp: , Rfl:   •  lisinopril (PRINIVIL,ZESTRIL) 40 MG tablet, Take 40 mg by mouth Daily., Disp: , Rfl:   •  metFORMIN (GLUCOPHAGE) 1000 MG tablet, Take 1,000 mg by mouth 2 (two) times a day with meals., Disp: , Rfl:   •  methylPREDNISolone (MEDROL, VLADIMIR,) 4 MG tablet, Take as directed on package instructions., Disp: 21 tablet, Rfl: 0  •  naproxen (NAPROSYN) 375 MG tablet, Take 375 mg by mouth 2 (Two) Times a Day As Needed for Mild Pain ., Disp: , Rfl:   •  nitrofurantoin (MACRODANTIN) 50 MG capsule, Take 50 mg by mouth every night., Disp: , Rfl:   •  promethazine (PHENERGAN) 25 MG tablet, Take 25 mg by mouth Every 6 (Six) Hours As Needed for Nausea or Vomiting., Disp: , Rfl:   •  traMADol (ULTRAM) 50  "MG tablet, Take 1 tablet by mouth 2 (Two) Times a Day As Needed for Moderate Pain ., Disp: 20 tablet, Rfl: 0  •  venlafaxine XR (EFFEXOR-XR) 150 MG 24 hr capsule, Take 150 mg by mouth 2 (two) times a day., Disp: , Rfl:     Past Surgical History:   Procedure Laterality Date   • ANTERIOR CERVICAL DISCECTOMY W/ FUSION N/A 3/9/2020    Procedure: CERVICAL DISCECTOMY ANTERIOR WITH FUSION C5-7;  Surgeon: Jairo White MD;  Location: [facility];  Service: Neurosurgery;  Laterality: N/A;   • APPENDECTOMY     •  SECTION      x4   • CHOLECYSTECTOMY     • HEMORRHOIDECTOMY     • HYSTERECTOMY     • TONSILLECTOMY         Social History     Socioeconomic History   • Marital status:      Spouse name: Not on file   • Number of children: Not on file   • Years of education: Not on file   • Highest education level: Not on file   Tobacco Use   • Smoking status: Never Smoker   • Smokeless tobacco: Never Used   Substance and Sexual Activity   • Alcohol use: No   • Drug use: No   • Sexual activity: Defer         Review of Systems   Constitutional: Negative.    Respiratory: Negative.    Cardiovascular: Negative.    Gastrointestinal: Negative.    Musculoskeletal: Positive for arthralgias, back pain, neck pain and neck stiffness.   Neurological: Positive for numbness and headaches. Negative for weakness.   Psychiatric/Behavioral: Negative.    All other systems reviewed and are negative.      Objective   Vital Signs: Blood pressure 146/66, temperature 97.7 °F (36.5 °C), height 167.6 cm (66\"), weight 107 kg (236 lb), not currently breastfeeding.  Physical Exam  Vitals signs and nursing note reviewed.   Constitutional:       General: She is not in acute distress.     Appearance: She is well-developed.   HENT:      Head: Normocephalic and atraumatic.   Eyes:      Pupils: Pupils are equal, round, and reactive to light.   Cardiovascular:      Heart sounds: Normal heart sounds.   Pulmonary:      Breath sounds: Normal breath sounds. "   Psychiatric:         Behavior: Behavior normal.         Thought Content: Thought content normal.     Musculoskeletal:     Strength is intact in upper and lower extremities to direct testing.     Station and gait are normal.     Straight leg raising is negative.   Neurologic:     Muscle tone is normal throughout.     Coordination is intact.     Deep tendon reflexes: 2+ and symmetrical.     Sensation is intact to light touch throughout.     Patient is oriented to person, place, and time.     Hurtado sign is negative.        Independent review of radiographic imaging: Plain films of the cervical spine demonstrate intact surgical construct C5-7 with evidence of intervertebral fusion    MRI of the lumbar spine from 2016 demonstrates some mild to moderate foraminal stenosis at L4-5 secondary to facet arthropathy and disc bulge.    Assessment/Plan   Diagnosis:  1.  Cervical spondylosis with radiculopathy status post ACDF C5-7  2.  Lumbar stenosis with neurogenic claudication   3.  Presumed carpal tunnel syndrome.     Medical Decision Making: I have referred Ms. Rider for EMG/NCV studies of the bilateral upper extremities.  I have discussed wrist splints to use in the interim.  I have also referred her for a lumbar MRI without gadolinium.  She will follow-up with me for review and further recommendation.        Diagnoses and all orders for this visit:    1. S/P cervical spinal fusion (Primary)    2. Herniated nucleus pulposus with myelopathy, cervical    3. Cervical radiculopathy    4. Acute pain of left shoulder    5. Bilateral hand numbness  -     EMG & Nerve Conduction Test; Future    6. Spinal stenosis, lumbar region, with neurogenic claudication  -     MRI Lumbar Spine Without Contrast; Future                        Patient's Body mass index is 38.09 kg/m². BMI is above normal parameters. Recommendations include: exercise counseling and nutrition counseling.         Dianne Dickinson PA-C  Patient Care  Team:  Susie Reeder MD as PCP - General (Internal Medicine)  [place], STEVEN Gallardo as Referring Physician (Family Medicine)

## 2020-12-23 DIAGNOSIS — M50.00 HERNIATED NUCLEUS PULPOSUS WITH MYELOPATHY, CERVICAL: ICD-10-CM

## 2020-12-23 DIAGNOSIS — Z98.1 S/P CERVICAL SPINAL FUSION: ICD-10-CM

## 2020-12-23 RX ORDER — TRAMADOL HYDROCHLORIDE 50 MG/1
50 TABLET ORAL 2 TIMES DAILY PRN
Qty: 45 TABLET | Refills: 0 | OUTPATIENT
Start: 2020-12-23 | End: 2021-03-17 | Stop reason: SDUPTHER

## 2020-12-23 RX ORDER — CYCLOBENZAPRINE HCL 10 MG
TABLET ORAL
Qty: 60 TABLET | Refills: 0 | Status: SHIPPED | OUTPATIENT
Start: 2020-12-23 | End: 2021-01-12

## 2020-12-23 NOTE — TELEPHONE ENCOUNTER
Caller: PT    Relationship: SELF    Best call back number: [phone]    Medication needed:   Requested Prescriptions     Pending Prescriptions Disp Refills   • traMADol (ULTRAM) 50 MG tablet 20 tablet 0     Sig: Take 1 tablet by mouth 2 (Two) Times a Day As Needed for Moderate Pain .       When do you need the refill by: ASAP    What details did the patient provide when requesting the medication: PT STATES SHE TAKE THIS MEDICATION 3 TIMES A DAY NOT 2 TIMES A DAY.    Does the patient have less than a 3 day supply:  [x] Yes  [] No    What is the patient's preferred pharmacy: [facility] [phone]

## 2020-12-23 NOTE — TELEPHONE ENCOUNTER
Medication phoned into patients pharmacy. Called and advised patient that this is the last refill until her follow up in January. Patient verbalized understanding.

## 2021-01-08 ENCOUNTER — TELEPHONE (OUTPATIENT)
Dept: NEUROSURGERY | Facility: CLINIC | Age: 61
End: 2021-01-08

## 2021-01-08 NOTE — TELEPHONE ENCOUNTER
Provider:  Christopher  Caller: patient  Time of call:   3:48  Phone #:  [phone]  Surgery:  ACDF  Surgery Date:  03/09/20  Last visit:   12/18/20  Next visit: 02/10/21    JAZMÍN:         Reason for call:     When did it start:A while ago, but recently worsened the beginning of this week.     Where is it located:Her neck. Patient states that she has a headache.  Feels like it has a crick in it.  She also complains of LUE pain and left shoulder pain.    How long has this been going on/is this new or the same as before surgery: Started at the beginning of the week and is it is the same pain she had before surgery, not as severe.    Characteristics of symptom/severity:Patient states that her LUE feels heavy.    Timing- Is it constant or intermittent:constant    What makes it worse:Patient states she cannot lay on her left side.  Some pressure on the shoulder helps.    What makes it better:Patient has some Tramadol and it helps.  She is also taking a muscle relaxer, just helps a little bit. She also uses icy hot.  Does make it feel better.    What therapies/medications have you tried:  NO PROBLEMS WITH BOWELS/BLADDER

## 2021-01-12 RX ORDER — CYCLOBENZAPRINE HCL 10 MG
TABLET ORAL
Qty: 60 TABLET | Refills: 0 | Status: SHIPPED | OUTPATIENT
Start: 2021-01-12 | End: 2021-02-22

## 2021-01-15 ENCOUNTER — APPOINTMENT (OUTPATIENT)
Dept: MRI IMAGING | Facility: HOSPITAL | Age: 61
End: 2021-01-15

## 2021-02-10 ENCOUNTER — HOSPITAL ENCOUNTER (OUTPATIENT)
Dept: NEUROLOGY | Facility: HOSPITAL | Age: 61
End: 2021-02-10

## 2021-02-10 ENCOUNTER — TELEPHONE (OUTPATIENT)
Dept: NEUROSURGERY | Facility: CLINIC | Age: 61
End: 2021-02-10

## 2021-02-10 ENCOUNTER — APPOINTMENT (OUTPATIENT)
Dept: MRI IMAGING | Facility: HOSPITAL | Age: 61
End: 2021-02-10

## 2021-02-22 RX ORDER — CYCLOBENZAPRINE HCL 10 MG
TABLET ORAL
Qty: 60 TABLET | Refills: 0 | Status: SHIPPED | OUTPATIENT
Start: 2021-02-22

## 2021-03-17 ENCOUNTER — HOSPITAL ENCOUNTER (OUTPATIENT)
Dept: MRI IMAGING | Facility: HOSPITAL | Age: 61
Discharge: HOME | End: 2021-03-17

## 2021-03-17 ENCOUNTER — HOSPITAL ENCOUNTER (OUTPATIENT)
Dept: NEUROLOGY | Facility: HOSPITAL | Age: 61
Discharge: HOME | End: 2021-03-17

## 2021-03-17 ENCOUNTER — OFFICE VISIT (OUTPATIENT)
Dept: NEUROSURGERY | Facility: CLINIC | Age: 61
End: 2021-03-17

## 2021-03-17 VITALS
DIASTOLIC BLOOD PRESSURE: 60 MMHG | HEIGHT: 66 IN | BODY MASS INDEX: 37.77 KG/M2 | WEIGHT: 235 LBS | SYSTOLIC BLOOD PRESSURE: 142 MMHG | TEMPERATURE: 97.6 F

## 2021-03-17 DIAGNOSIS — M48.062 SPINAL STENOSIS, LUMBAR REGION, WITH NEUROGENIC CLAUDICATION: ICD-10-CM

## 2021-03-17 DIAGNOSIS — M25.512 ACUTE PAIN OF LEFT SHOULDER: ICD-10-CM

## 2021-03-17 DIAGNOSIS — M50.00 HERNIATED NUCLEUS PULPOSUS WITH MYELOPATHY, CERVICAL: ICD-10-CM

## 2021-03-17 DIAGNOSIS — M54.12 CERVICAL RADICULOPATHY: Primary | ICD-10-CM

## 2021-03-17 DIAGNOSIS — R20.0 BILATERAL HAND NUMBNESS: ICD-10-CM

## 2021-03-17 DIAGNOSIS — Z98.1 S/P CERVICAL SPINAL FUSION: ICD-10-CM

## 2021-03-17 DIAGNOSIS — G56.03 BILATERAL CARPAL TUNNEL SYNDROME: ICD-10-CM

## 2021-03-17 PROCEDURE — 95911 NRV CNDJ TEST 9-10 STUDIES: CPT

## 2021-03-17 PROCEDURE — 95886 MUSC TEST DONE W/N TEST COMP: CPT | Performed by: PSYCHIATRY & NEUROLOGY

## 2021-03-17 PROCEDURE — 72148 MRI LUMBAR SPINE W/O DYE: CPT

## 2021-03-17 PROCEDURE — 95911 NRV CNDJ TEST 9-10 STUDIES: CPT | Performed by: PSYCHIATRY & NEUROLOGY

## 2021-03-17 PROCEDURE — 95886 MUSC TEST DONE W/N TEST COMP: CPT

## 2021-03-17 PROCEDURE — 99214 OFFICE O/P EST MOD 30 MIN: CPT | Performed by: PHYSICIAN ASSISTANT

## 2021-03-17 RX ORDER — TRAMADOL HYDROCHLORIDE 50 MG/1
50 TABLET ORAL EVERY 6 HOURS PRN
Qty: 50 TABLET | Refills: 0 | Status: SHIPPED | OUTPATIENT
Start: 2021-03-17

## 2021-03-17 NOTE — PROGRESS NOTES
Patient: Medina Rider  : 1960  Chart #: [id number]    Date of Service: 2021    CHIEF COMPLAINT:   1.   Neck and left shoulder pain  2.   Bilateral hand numbness   3.   Low back pain with walking and standing intolerance.        History of Present Illness Ms. Rider is seen in follow-up.  She is a 60-year-old woman who is well-known to our clinic.  Her history is significant for undergoing ACDF with allografting and plating C5-7 on 3/9/2020.  Preoperative studies demonstrated spondylosis with cord compromise.  Postoperatively her progress has been slow.  Lately she has had more neck pain radiating to the left shoulder.  She mentioned this pain about 3 months ago.  We have been trying some conservative therapies.  Today she feels much worse.  Sometimes she experiences a hot burning sensation in the posterior neck that radiates up into her head.  She has constant severe pain in the neck that radiates to the left shoulder and upper arm.  She also complains of numbness in the median nerve distribution bilaterally, worse with activity/overuse.  She has some tenderness at the right elbow with numbness down the forearm    More recently she is also complained of increased low back pain with radiation into the lower extremities.  This has been chronic.  Symptoms are much worse with prolonged standing and walking.  She is having trouble standing for more than 10 minutes to cook a meal.  Symptoms are alleviated when sitting.  Lately her neck pain has been much worse than her low back difficulties.      Past Medical History:   Diagnosis Date   • Anemia    • Anesthesia complication     elevated BP after surgeries   • Anxiety and depression    • Arthritis    • Bipolar 1 disorder (CMS/HCC)    • Diabetes mellitus (CMS/HCC)    • Disease of thyroid gland     nodule on L lobe, PCP monitoring   • GERD (gastroesophageal reflux disease)    • Heart murmur    • History of kidney stones    • Hyperlipidemia    •  Hypertension    • Panic attacks    • Pneumonia     last incident 1/2020   • Rheumatic fever     as a child    • Sleep apnea     wears CPAP sometimes    • UTI (urinary tract infection)     last one 6 months ago         Current Outpatient Medications:   •  acetaminophen (TYLENOL) 500 MG tablet, Take 500 mg by mouth Every 6 (Six) Hours As Needed for Mild Pain ., Disp: , Rfl:   •  aspirin 81 MG chewable tablet, Chew 81 mg daily., Disp: , Rfl:   •  cyclobenzaprine (FLEXERIL) 10 MG tablet, TAKE 1 TABLET BY MOUTH THREE TIMES A DAY AS NEEDED FOR MUSCLE SPASMS, Disp: 60 tablet, Rfl: 0  •  estradiol (ESTRACE) 2 MG tablet, Take 2 mg by mouth Daily., Disp: , Rfl:   •  ezetimibe (ZETIA) 10 MG tablet, Take 10 mg by mouth Daily., Disp: , Rfl:   •  famotidine (PEPCID) 10 MG tablet, Take 10 mg by mouth 2 (two) times a day., Disp: , Rfl:   •  gabapentin (NEURONTIN) 800 MG tablet, Take 800 mg by mouth 3 (three) times a day., Disp: , Rfl:   •  HYDROcodone-acetaminophen (NORCO)  MG per tablet, Take 1 tablet by mouth 3 (Three) Times a Day As Needed for Moderate Pain ., Disp: 20 tablet, Rfl: 0  •  HYDROcodone-acetaminophen (NORCO) 7.5-325 MG per tablet, Take 1 tablet by mouth Every 8 (Eight) Hours As Needed for Moderate Pain ., Disp: 25 tablet, Rfl: 0  •  insulin glargine (LANTUS) 100 UNIT/ML injection, Inject 80 Units under the skin into the appropriate area as directed Every Night., Disp: , Rfl:   •  Insulin Lispro (HUMALOG SC), Inject  under the skin into the appropriate area as directed 3 (Three) Times a Day. Per sliding scale, Disp: , Rfl:   •  linaclotide (LINZESS) 145 MCG capsule capsule, Take 145 mcg by mouth Every Morning Before Breakfast., Disp: , Rfl:   •  lisinopril (PRINIVIL,ZESTRIL) 40 MG tablet, Take 40 mg by mouth Daily., Disp: , Rfl:   •  metFORMIN (GLUCOPHAGE) 1000 MG tablet, Take 1,000 mg by mouth 2 (two) times a day with meals., Disp: , Rfl:   •  methylPREDNISolone (MEDROL, VLADIMIR,) 4 MG tablet, Take as directed on  "package instructions., Disp: 21 tablet, Rfl: 0  •  naproxen (NAPROSYN) 375 MG tablet, Take 375 mg by mouth 2 (Two) Times a Day As Needed for Mild Pain ., Disp: , Rfl:   •  nitrofurantoin (MACRODANTIN) 50 MG capsule, Take 50 mg by mouth every night., Disp: , Rfl:   •  promethazine (PHENERGAN) 25 MG tablet, Take 25 mg by mouth Every 6 (Six) Hours As Needed for Nausea or Vomiting., Disp: , Rfl:   •  traMADol (ULTRAM) 50 MG tablet, Take 1 tablet by mouth 2 (Two) Times a Day As Needed for Moderate Pain ., Disp: 45 tablet, Rfl: 0  •  venlafaxine XR (EFFEXOR-XR) 150 MG 24 hr capsule, Take 150 mg by mouth 2 (two) times a day., Disp: , Rfl:     Past Surgical History:   Procedure Laterality Date   • ANTERIOR CERVICAL DISCECTOMY W/ FUSION N/A 3/9/2020    Procedure: CERVICAL DISCECTOMY ANTERIOR WITH FUSION C5-7;  Surgeon: Jairo White MD;  Location: [facility];  Service: Neurosurgery;  Laterality: N/A;   • APPENDECTOMY     •  SECTION      x4   • CHOLECYSTECTOMY     • HEMORRHOIDECTOMY     • HYSTERECTOMY     • TONSILLECTOMY         Social History     Socioeconomic History   • Marital status:      Spouse name: Not on file   • Number of children: Not on file   • Years of education: Not on file   • Highest education level: Not on file   Tobacco Use   • Smoking status: Never Smoker   • Smokeless tobacco: Never Used   Substance and Sexual Activity   • Alcohol use: No   • Drug use: No   • Sexual activity: Defer         Review of Systems   Musculoskeletal: Positive for back pain and neck pain.   All other systems reviewed and are negative.      Objective   Vital Signs: Blood pressure 142/60, temperature 97.6 °F (36.4 °C), height 167.6 cm (66\"), weight 107 kg (235 lb), not currently breastfeeding.  Physical Exam   Vitals signs and nursing note reviewed.   Constitutional:       General: She is not in acute distress.     Appearance: She is well-developed.   HENT:      Head: Normocephalic and atraumatic.   Eyes:      " Pupils: Pupils are equal, round, and reactive to light.   Cardiovascular:      Heart sounds: Normal heart sounds.   Pulmonary:      Breath sounds: Normal breath sounds.   Psychiatric:         Behavior: Behavior normal.         Thought Content: Thought content normal.   Musculoskeletal:     Strength is intact in upper and lower extremities to direct testing.     Station and gait are normal.     Straight leg raising is negative.   Neurologic:     Muscle tone is normal throughout.     Coordination is intact.     Deep tendon reflexes: 2+ and symmetrical.     Sensation is intact to light touch throughout.     Patient is oriented to person, place, and time.     Hurtado sign is negative.        Independent review of radiographic imaging: MRI of the lumbar spine demonstrates normal vertebral alignment.  L4-5 there is broad-based disc bulge and facet arthropathy narrowing the recesses bilaterally. I reviewed MRI with patient in the room and answered her questions.     EMG/NCV studies performed by Dr. nAtonio demonstrate  1.  Moderate to severe bilateral median neuropathies at the wrists  2.  Right ulnar neuropathy at the elbow  3.  Chronic right C5-6 and C6-7 radiculopathies  4.  Underlying length dependent sensorimotor polyneuropathy    Assessment/Plan   Diagnosis:  1.  Cervical spondylosis with radiculopathy status post ACDF C5-7  2.  Mechanical low back pain    3.  Bilateral carpal tunnel syndrome  4.  Right ulnar neuropathy       Medical Decision Making: Ms. Rider has been struggling with neck and left arm pain for about 4 months despite time and conservative measures. Her pain is progressing.  I feel that Mri of the cervical spine is warranted for further evaluation.  I prescribed wrist splints to be worn for 6 weeks for carpal tunnel syndrome.  Her low back pain is mechanical and should be managed symptomatically. She has had success with facet blocks in the past.  Follow up with cervical MRI and further  recommendations will be made at that time.          Diagnoses and all orders for this visit:    1. Cervical radiculopathy (Primary)  -     MRI Cervical Spine Without Contrast; Future    2. Herniated nucleus pulposus with myelopathy, cervical  -     MRI Cervical Spine Without Contrast; Future    3. Acute pain of left shoulder  -     MRI Cervical Spine Without Contrast; Future    4. Bilateral hand numbness  -     Cancel: Miscellaneous DME  -     Miscellaneous DME    5. Spinal stenosis, lumbar region, with neurogenic claudication    6. S/P cervical spinal fusion  -     MRI Cervical Spine Without Contrast; Future    7. BMI 38.0-38.9,adult    8. Bilateral carpal tunnel syndrome  -     Cancel: Miscellaneous DME  -     Miscellaneous DME                        Patient's Body mass index is 37.93 kg/m². BMI is above normal parameters. Recommendations include: exercise counseling and nutrition counseling.         Dianne Dickinson PA-C  Patient Care Team:  Susie Reeder MD as PCP - General (Internal Medicine)  [place], STEVEN Gallardo as Referring Physician (Family Medicine)

## 2021-04-07 ENCOUNTER — APPOINTMENT (OUTPATIENT)
Dept: MRI IMAGING | Facility: HOSPITAL | Age: 61
End: 2021-04-07

## 2021-05-14 ENCOUNTER — OFFICE VISIT (OUTPATIENT)
Dept: NEUROSURGERY | Facility: CLINIC | Age: 61
End: 2021-05-14

## 2021-05-14 ENCOUNTER — HOSPITAL ENCOUNTER (OUTPATIENT)
Dept: MRI IMAGING | Facility: HOSPITAL | Age: 61
Discharge: HOME | End: 2021-05-14
Admitting: PHYSICIAN ASSISTANT

## 2021-05-14 VITALS
WEIGHT: 234 LBS | TEMPERATURE: 97.6 F | HEIGHT: 66 IN | DIASTOLIC BLOOD PRESSURE: 62 MMHG | SYSTOLIC BLOOD PRESSURE: 152 MMHG | BODY MASS INDEX: 37.61 KG/M2

## 2021-05-14 DIAGNOSIS — M54.12 CERVICAL RADICULOPATHY: ICD-10-CM

## 2021-05-14 DIAGNOSIS — M25.512 ACUTE PAIN OF LEFT SHOULDER: ICD-10-CM

## 2021-05-14 DIAGNOSIS — R20.0 BILATERAL HAND NUMBNESS: ICD-10-CM

## 2021-05-14 DIAGNOSIS — G56.03 BILATERAL CARPAL TUNNEL SYNDROME: Primary | ICD-10-CM

## 2021-05-14 DIAGNOSIS — M50.00 HERNIATED NUCLEUS PULPOSUS WITH MYELOPATHY, CERVICAL: ICD-10-CM

## 2021-05-14 DIAGNOSIS — M48.062 SPINAL STENOSIS, LUMBAR REGION, WITH NEUROGENIC CLAUDICATION: ICD-10-CM

## 2021-05-14 DIAGNOSIS — Z98.1 S/P CERVICAL SPINAL FUSION: ICD-10-CM

## 2021-05-14 PROCEDURE — 72141 MRI NECK SPINE W/O DYE: CPT

## 2021-05-14 PROCEDURE — 99215 OFFICE O/P EST HI 40 MIN: CPT | Performed by: PHYSICIAN ASSISTANT

## 2021-05-14 NOTE — H&P
Patient: Medina Rider  : 1960  Chart #: [id number]    Date of Service: 2021    CHIEF COMPLAINT:   1.   Neck and left shoulder pain  2.   Bilateral hand numbness   3.   Low back pain with walking and standing intolerance.        History of Present Illness Ms. Rider is seen in follow-up.  She is a 60-year-old woman who is well-known to our clinic.  Her history is significant for undergoing ACDF with allografting and plating C5-7 on 3/9/2020.  Preoperative studies demonstrated spondylosis with cord compromise.  Postoperatively her progress has been slow.  Lately she has had more neck pain radiating to the left shoulder.  She mentioned this pain about 3 months ago.  We have been trying some conservative therapies.  Today she feels much worse.  Sometimes she experiences a hot burning sensation in the posterior neck that radiates up into her head.  She has constant severe pain in the neck that radiates to the left shoulder and upper arm.     She also complains of numbness in the median nerve distribution bilaterally, worse with activity/overuse.  She has some tenderness at the right elbow with numbness down the forearm.  She has tried wrist splints to no avail.    More recently she is also complained of increased low back pain with radiation into the lower extremities.  This has been chronic.  Symptoms are much worse with prolonged standing and walking.  She is having trouble standing for more than 10 minutes to cook a meal.  Symptoms are alleviated when sitting.  Lately her neck pain has been much worse than her low back difficulties.      Past Medical History:   Diagnosis Date   • Anemia    • Anesthesia complication     elevated BP after surgeries   • Anxiety and depression    • Arthritis    • Bipolar 1 disorder (CMS/[facility])    • Diabetes mellitus (CMS/[facility])    • Disease of thyroid gland     nodule on L lobe, PCP monitoring   • GERD (gastroesophageal reflux disease)    • Heart murmur    •  History of kidney stones    • Hyperlipidemia    • Hypertension    • Panic attacks    • Pneumonia     last incident 1/2020   • Rheumatic fever     as a child    • Sleep apnea     wears CPAP sometimes    • UTI (urinary tract infection)     last one 6 months ago         Current Outpatient Medications:   •  acetaminophen (TYLENOL) 500 MG tablet, Take 500 mg by mouth Every 6 (Six) Hours As Needed for Mild Pain ., Disp: , Rfl:   •  aspirin 81 MG chewable tablet, Chew 81 mg daily., Disp: , Rfl:   •  cyclobenzaprine (FLEXERIL) 10 MG tablet, TAKE 1 TABLET BY MOUTH THREE TIMES A DAY AS NEEDED FOR MUSCLE SPASMS, Disp: 60 tablet, Rfl: 0  •  estradiol (ESTRACE) 2 MG tablet, Take 2 mg by mouth Daily., Disp: , Rfl:   •  ezetimibe (ZETIA) 10 MG tablet, Take 10 mg by mouth Daily., Disp: , Rfl:   •  famotidine (PEPCID) 10 MG tablet, Take 10 mg by mouth 2 (two) times a day., Disp: , Rfl:   •  gabapentin (NEURONTIN) 800 MG tablet, Take 800 mg by mouth 3 (three) times a day., Disp: , Rfl:   •  HYDROcodone-acetaminophen (NORCO)  MG per tablet, Take 1 tablet by mouth 3 (Three) Times a Day As Needed for Moderate Pain ., Disp: 20 tablet, Rfl: 0  •  HYDROcodone-acetaminophen (NORCO) 7.5-325 MG per tablet, Take 1 tablet by mouth Every 8 (Eight) Hours As Needed for Moderate Pain ., Disp: 25 tablet, Rfl: 0  •  insulin glargine (LANTUS) 100 UNIT/ML injection, Inject 80 Units under the skin into the appropriate area as directed Every Night., Disp: , Rfl:   •  Insulin Lispro (HUMALOG SC), Inject  under the skin into the appropriate area as directed 3 (Three) Times a Day. Per sliding scale, Disp: , Rfl:   •  linaclotide (LINZESS) 145 MCG capsule capsule, Take 145 mcg by mouth Every Morning Before Breakfast., Disp: , Rfl:   •  lisinopril (PRINIVIL,ZESTRIL) 40 MG tablet, Take 40 mg by mouth Daily., Disp: , Rfl:   •  metFORMIN (GLUCOPHAGE) 1000 MG tablet, Take 1,000 mg by mouth 2 (two) times a day with meals., Disp: , Rfl:   •   "methylPREDNISolone (MEDROL, VLADIMIR,) 4 MG tablet, Take as directed on package instructions., Disp: 21 tablet, Rfl: 0  •  naproxen (NAPROSYN) 375 MG tablet, Take 375 mg by mouth 2 (Two) Times a Day As Needed for Mild Pain ., Disp: , Rfl:   •  nitrofurantoin (MACRODANTIN) 50 MG capsule, Take 50 mg by mouth every night., Disp: , Rfl:   •  promethazine (PHENERGAN) 25 MG tablet, Take 25 mg by mouth Every 6 (Six) Hours As Needed for Nausea or Vomiting., Disp: , Rfl:   •  traMADol (ULTRAM) 50 MG tablet, Take 1 tablet by mouth 2 (Two) Times a Day As Needed for Moderate Pain ., Disp: 45 tablet, Rfl: 0  •  venlafaxine XR (EFFEXOR-XR) 150 MG 24 hr capsule, Take 150 mg by mouth 2 (two) times a day., Disp: , Rfl:     Past Surgical History:   Procedure Laterality Date   • ANTERIOR CERVICAL DISCECTOMY W/ FUSION N/A 3/9/2020    Procedure: CERVICAL DISCECTOMY ANTERIOR WITH FUSION C5-7;  Surgeon: Jairo White MD;  Location: [facility];  Service: Neurosurgery;  Laterality: N/A;   • APPENDECTOMY     •  SECTION      x4   • CHOLECYSTECTOMY     • HEMORRHOIDECTOMY     • HYSTERECTOMY     • TONSILLECTOMY         Social History     Socioeconomic History   • Marital status:      Spouse name: Not on file   • Number of children: Not on file   • Years of education: Not on file   • Highest education level: Not on file   Tobacco Use   • Smoking status: Never Smoker   • Smokeless tobacco: Never Used   Substance and Sexual Activity   • Alcohol use: No   • Drug use: No   • Sexual activity: Defer         Review of Systems   Musculoskeletal: Positive for back pain and neck pain.   All other systems reviewed and are negative.      Objective   Vital Signs: Blood pressure 142/60, temperature 97.6 °F (36.4 °C), height 167.6 cm (66\"), weight 107 kg (235 lb), not currently breastfeeding.  Physical Exam   Vitals signs and nursing note reviewed.   Constitutional:       General: She is not in acute distress.     Appearance: She is well-developed. "   HENT:      Head: Normocephalic and atraumatic.   Eyes:      Pupils: Pupils are equal, round, and reactive to light.   Cardiovascular:      Heart sounds: Normal heart sounds.   Pulmonary:      Breath sounds: Normal breath sounds.   Psychiatric:         Behavior: Behavior normal.         Thought Content: Thought content normal.   Musculoskeletal:     Strength is intact in upper and lower extremities to direct testing.     Station and gait are normal.     Straight leg raising is negative.   Neurologic:     Muscle tone is normal throughout.     Coordination is intact.     Deep tendon reflexes: 2+ and symmetrical.     Sensation is intact to light touch throughout.     Patient is oriented to person, place, and time.     Hurtado sign is negative.        Independent review of radiographic imaging: MRI of the lumbar spine demonstrates normal vertebral alignment.  L4-5 there is broad-based disc bulge and facet arthropathy narrowing the recesses bilaterally. I reviewed MRI with patient in the room and answered her questions.     EMG/NCV studies performed by Dr. Antonio demonstrate  1.  Moderate to severe bilateral median neuropathies at the wrists  2.  Right ulnar neuropathy at the elbow  3.  Chronic right C5-6 and C6-7 radiculopathies  4.  Underlying length dependent sensorimotor polyneuropathy    MRI of the cervical spine demonstrates postoperative changes from ACDF C5-7.  Above the level of surgery at C4-5 there is a very small leftward disc osteophyte complex that could be source for some degree of her symptoms.    Assessment/Plan   Diagnosis:  1.  Cervical spondylosis with radiculopathy status post ACDF C5-7  2.  Mechanical low back pain    3.  Bilateral carpal tunnel syndrome  4.  Right ulnar neuropathy       Medical Decision Making: Patient has a lot going on in terms of her neck and low back difficulties.  I had like to send her for pain management to see if we cannot sort through some of this.  She does have some  findings on her cervical MRI that could be source for her left shoulder discomfort however this would not be a panacea for all of her complaints.  She is very amenable to trying some injections.    I have recommended a staged carpal tunnel release beginning on the left.  We may consider ulnar nerve decompression on the right as well once she heals up from the first surgery.  I discussed the general nature of the procedure as well as risk, limitations, and complications and she would like to proceed.        Diagnoses and all orders for this visit:    1. Cervical radiculopathy (Primary)  -     MRI Cervical Spine Without Contrast; Future    2. Herniated nucleus pulposus with myelopathy, cervical  -     MRI Cervical Spine Without Contrast; Future    3. Acute pain of left shoulder  -     MRI Cervical Spine Without Contrast; Future    4. Bilateral hand numbness  -     Cancel: Miscellaneous DME  -     Miscellaneous DME    5. Spinal stenosis, lumbar region, with neurogenic claudication    6. S/P cervical spinal fusion  -     MRI Cervical Spine Without Contrast; Future    7. BMI 38.0-38.9,adult    8. Bilateral carpal tunnel syndrome  -     Cancel: Miscellaneous DME  -     Miscellaneous DME                        Patient's Body mass index is 37.93 kg/m². BMI is above normal parameters. Recommendations include: exercise counseling and nutrition counseling.         Dianne Dickinson PA-C  Patient Care Team:  Susie Reeder MD as PCP - General (Internal Medicine)  Vero BeachStella APRN as Referring Physician (Family Medicine)

## 2021-05-14 NOTE — PROGRESS NOTES
Patient: Medina Rider  : 1960  Chart #: [id number]    Date of Service: 2021    CHIEF COMPLAINT:   1.   Neck and left shoulder pain  2.   Bilateral hand numbness   3.   Low back pain with walking and standing intolerance.        History of Present Illness Ms. Rider is seen in follow-up.  She is a 60-year-old woman who is well-known to our clinic.  Her history is significant for undergoing ACDF with allografting and plating C5-7 on 3/9/2020.  Preoperative studies demonstrated spondylosis with cord compromise.  Postoperatively her progress has been slow.  Lately she has had more neck pain radiating to the left shoulder.  She mentioned this pain about 3 months ago.  We have been trying some conservative therapies.  Today she feels much worse.  Sometimes she experiences a hot burning sensation in the posterior neck that radiates up into her head.  She has constant severe pain in the neck that radiates to the left shoulder and upper arm.     She also complains of numbness in the median nerve distribution bilaterally, worse with activity/overuse.  She has some tenderness at the right elbow with numbness down the forearm.  She has tried wrist splints to no avail.    More recently she is also complained of increased low back pain with radiation into the lower extremities.  This has been chronic.  Symptoms are much worse with prolonged standing and walking.  She is having trouble standing for more than 10 minutes to cook a meal.  Symptoms are alleviated when sitting.  Lately her neck pain has been much worse than her low back difficulties.      Past Medical History:   Diagnosis Date   • Anemia    • Anesthesia complication     elevated BP after surgeries   • Anxiety and depression    • Arthritis    • Bipolar 1 disorder (CMS/[facility])    • Diabetes mellitus (CMS/[facility])    • Disease of thyroid gland     nodule on L lobe, PCP monitoring   • GERD (gastroesophageal reflux disease)    • Heart murmur    •  History of kidney stones    • Hyperlipidemia    • Hypertension    • Panic attacks    • Pneumonia     last incident 1/2020   • Rheumatic fever     as a child    • Sleep apnea     wears CPAP sometimes    • UTI (urinary tract infection)     last one 6 months ago         Current Outpatient Medications:   •  acetaminophen (TYLENOL) 500 MG tablet, Take 500 mg by mouth Every 6 (Six) Hours As Needed for Mild Pain ., Disp: , Rfl:   •  aspirin 81 MG chewable tablet, Chew 81 mg daily., Disp: , Rfl:   •  cyclobenzaprine (FLEXERIL) 10 MG tablet, TAKE 1 TABLET BY MOUTH THREE TIMES A DAY AS NEEDED FOR MUSCLE SPASMS, Disp: 60 tablet, Rfl: 0  •  estradiol (ESTRACE) 2 MG tablet, Take 2 mg by mouth Daily., Disp: , Rfl:   •  ezetimibe (ZETIA) 10 MG tablet, Take 10 mg by mouth Daily., Disp: , Rfl:   •  famotidine (PEPCID) 10 MG tablet, Take 10 mg by mouth 2 (two) times a day., Disp: , Rfl:   •  gabapentin (NEURONTIN) 800 MG tablet, Take 800 mg by mouth 3 (three) times a day., Disp: , Rfl:   •  HYDROcodone-acetaminophen (NORCO)  MG per tablet, Take 1 tablet by mouth 3 (Three) Times a Day As Needed for Moderate Pain ., Disp: 20 tablet, Rfl: 0  •  HYDROcodone-acetaminophen (NORCO) 7.5-325 MG per tablet, Take 1 tablet by mouth Every 8 (Eight) Hours As Needed for Moderate Pain ., Disp: 25 tablet, Rfl: 0  •  insulin glargine (LANTUS) 100 UNIT/ML injection, Inject 80 Units under the skin into the appropriate area as directed Every Night., Disp: , Rfl:   •  Insulin Lispro (HUMALOG SC), Inject  under the skin into the appropriate area as directed 3 (Three) Times a Day. Per sliding scale, Disp: , Rfl:   •  linaclotide (LINZESS) 145 MCG capsule capsule, Take 145 mcg by mouth Every Morning Before Breakfast., Disp: , Rfl:   •  lisinopril (PRINIVIL,ZESTRIL) 40 MG tablet, Take 40 mg by mouth Daily., Disp: , Rfl:   •  metFORMIN (GLUCOPHAGE) 1000 MG tablet, Take 1,000 mg by mouth 2 (two) times a day with meals., Disp: , Rfl:   •   "methylPREDNISolone (MEDROL, VLADIMIR,) 4 MG tablet, Take as directed on package instructions., Disp: 21 tablet, Rfl: 0  •  naproxen (NAPROSYN) 375 MG tablet, Take 375 mg by mouth 2 (Two) Times a Day As Needed for Mild Pain ., Disp: , Rfl:   •  nitrofurantoin (MACRODANTIN) 50 MG capsule, Take 50 mg by mouth every night., Disp: , Rfl:   •  promethazine (PHENERGAN) 25 MG tablet, Take 25 mg by mouth Every 6 (Six) Hours As Needed for Nausea or Vomiting., Disp: , Rfl:   •  traMADol (ULTRAM) 50 MG tablet, Take 1 tablet by mouth 2 (Two) Times a Day As Needed for Moderate Pain ., Disp: 45 tablet, Rfl: 0  •  venlafaxine XR (EFFEXOR-XR) 150 MG 24 hr capsule, Take 150 mg by mouth 2 (two) times a day., Disp: , Rfl:     Past Surgical History:   Procedure Laterality Date   • ANTERIOR CERVICAL DISCECTOMY W/ FUSION N/A 3/9/2020    Procedure: CERVICAL DISCECTOMY ANTERIOR WITH FUSION C5-7;  Surgeon: Jairo White MD;  Location: [facility];  Service: Neurosurgery;  Laterality: N/A;   • APPENDECTOMY     •  SECTION      x4   • CHOLECYSTECTOMY     • HEMORRHOIDECTOMY     • HYSTERECTOMY     • TONSILLECTOMY         Social History     Socioeconomic History   • Marital status:      Spouse name: Not on file   • Number of children: Not on file   • Years of education: Not on file   • Highest education level: Not on file   Tobacco Use   • Smoking status: Never Smoker   • Smokeless tobacco: Never Used   Substance and Sexual Activity   • Alcohol use: No   • Drug use: No   • Sexual activity: Defer         Review of Systems   Musculoskeletal: Positive for back pain and neck pain.   All other systems reviewed and are negative.      Objective   Vital Signs: Blood pressure 142/60, temperature 97.6 °F (36.4 °C), height 167.6 cm (66\"), weight 107 kg (235 lb), not currently breastfeeding.  Physical Exam   Vitals signs and nursing note reviewed.   Constitutional:       General: She is not in acute distress.     Appearance: She is well-developed. "   HENT:      Head: Normocephalic and atraumatic.   Eyes:      Pupils: Pupils are equal, round, and reactive to light.   Cardiovascular:      Heart sounds: Normal heart sounds.   Pulmonary:      Breath sounds: Normal breath sounds.   Psychiatric:         Behavior: Behavior normal.         Thought Content: Thought content normal.   Musculoskeletal:     Strength is intact in upper and lower extremities to direct testing.     Station and gait are normal.     Straight leg raising is negative.   Neurologic:     Muscle tone is normal throughout.     Coordination is intact.     Deep tendon reflexes: 2+ and symmetrical.     Sensation is intact to light touch throughout.     Patient is oriented to person, place, and time.     Hurtado sign is negative.        Independent review of radiographic imaging: MRI of the lumbar spine demonstrates normal vertebral alignment.  L4-5 there is broad-based disc bulge and facet arthropathy narrowing the recesses bilaterally. I reviewed MRI with patient in the room and answered her questions.     EMG/NCV studies performed by Dr. Antonio demonstrate  1.  Moderate to severe bilateral median neuropathies at the wrists  2.  Right ulnar neuropathy at the elbow  3.  Chronic right C5-6 and C6-7 radiculopathies  4.  Underlying length dependent sensorimotor polyneuropathy    MRI of the cervical spine demonstrates postoperative changes from ACDF C5-7.  Above the level of surgery at C4-5 there is a very small leftward disc osteophyte complex that could be source for some degree of her symptoms.    Assessment/Plan   Diagnosis:  1.  Cervical spondylosis with radiculopathy status post ACDF C5-7  2.  Mechanical low back pain    3.  Bilateral carpal tunnel syndrome  4.  Right ulnar neuropathy       Medical Decision Making: Patient has a lot going on in terms of her neck and low back difficulties.  I had like to send her for pain management to see if we cannot sort through some of this.  She does have some  findings on her cervical MRI that could be source for her left shoulder discomfort however this would not be a panacea for all of her complaints.  She is very amenable to trying some injections.    I have recommended a staged carpal tunnel release beginning on the left.  We may consider ulnar nerve decompression on the right as well once she heals up from the first surgery.  I discussed the general nature of the procedure as well as risk, limitations, and complications and she would like to proceed.        Diagnoses and all orders for this visit:    1. Cervical radiculopathy (Primary)  -     MRI Cervical Spine Without Contrast; Future    2. Herniated nucleus pulposus with myelopathy, cervical  -     MRI Cervical Spine Without Contrast; Future    3. Acute pain of left shoulder  -     MRI Cervical Spine Without Contrast; Future    4. Bilateral hand numbness  -     Cancel: Miscellaneous DME  -     Miscellaneous DME    5. Spinal stenosis, lumbar region, with neurogenic claudication    6. S/P cervical spinal fusion  -     MRI Cervical Spine Without Contrast; Future    7. BMI 38.0-38.9,adult    8. Bilateral carpal tunnel syndrome  -     Cancel: Miscellaneous DME  -     Miscellaneous DME                        Patient's Body mass index is 37.93 kg/m². BMI is above normal parameters. Recommendations include: exercise counseling and nutrition counseling.         Dianne Dickinson PA-C  Patient Care Team:  Susie Reeder MD as PCP - General (Internal Medicine)  ClaritaStella APRN as Referring Physician (Family Medicine)

## 2021-06-04 ENCOUNTER — APPOINTMENT (OUTPATIENT)
Dept: PREADMISSION TESTING | Facility: HOSPITAL | Age: 61
End: 2021-06-04

## 2021-06-04 ENCOUNTER — PRE-ADMISSION TESTING (OUTPATIENT)
Dept: PREADMISSION TESTING | Facility: HOSPITAL | Age: 61
End: 2021-06-04

## 2021-06-04 VITALS — BODY MASS INDEX: 37.57 KG/M2 | WEIGHT: 233.8 LBS | HEIGHT: 66 IN

## 2021-06-04 DIAGNOSIS — G56.03 BILATERAL CARPAL TUNNEL SYNDROME: ICD-10-CM

## 2021-06-04 LAB
ALBUMIN SERPL-MCNC: 4.2 G/DL (ref 3.5–5.2)
ALBUMIN/GLOB SERPL: 1.7 G/DL
ALP SERPL-CCNC: 125 U/L (ref 39–117)
ALT SERPL W P-5'-P-CCNC: 26 U/L (ref 1–33)
ANION GAP SERPL CALCULATED.3IONS-SCNC: 11 MMOL/L (ref 5–15)
AST SERPL-CCNC: 25 U/L (ref 1–32)
BASOPHILS # BLD AUTO: 0.05 10*3/MM3 (ref 0–0.2)
BASOPHILS NFR BLD AUTO: 0.5 % (ref 0–1.5)
BILIRUB SERPL-MCNC: 0.8 MG/DL (ref 0–1.2)
BUN SERPL-MCNC: 8 MG/DL (ref 6–20)
BUN/CREAT SERPL: 9 (ref 7–25)
CALCIUM SPEC-SCNC: 9.7 MG/DL (ref 8.6–10.5)
CHLORIDE SERPL-SCNC: 101 MMOL/L (ref 98–107)
CO2 SERPL-SCNC: 27 MMOL/L (ref 22–29)
CREAT SERPL-MCNC: 0.89 MG/DL (ref 0.57–1)
DEPRECATED RDW RBC AUTO: 44.8 FL (ref 37–54)
EOSINOPHIL # BLD AUTO: 0.1 10*3/MM3 (ref 0–0.4)
EOSINOPHIL NFR BLD AUTO: 1.1 % (ref 0.3–6.2)
ERYTHROCYTE [DISTWIDTH] IN BLOOD BY AUTOMATED COUNT: 12.7 % (ref 12.3–15.4)
GFR SERPL CREATININE-BSD FRML MDRD: 65 ML/MIN/1.73
GLOBULIN UR ELPH-MCNC: 2.5 GM/DL
GLUCOSE SERPL-MCNC: 161 MG/DL (ref 65–99)
HCT VFR BLD AUTO: 41.3 % (ref 34–46.6)
HGB BLD-MCNC: 13.2 G/DL (ref 12–15.9)
IMM GRANULOCYTES # BLD AUTO: 0.03 10*3/MM3 (ref 0–0.05)
IMM GRANULOCYTES NFR BLD AUTO: 0.3 % (ref 0–0.5)
LYMPHOCYTES # BLD AUTO: 2.62 10*3/MM3 (ref 0.7–3.1)
LYMPHOCYTES NFR BLD AUTO: 28.7 % (ref 19.6–45.3)
MCH RBC QN AUTO: 30.3 PG (ref 26.6–33)
MCHC RBC AUTO-ENTMCNC: 32 G/DL (ref 31.5–35.7)
MCV RBC AUTO: 94.9 FL (ref 79–97)
MONOCYTES # BLD AUTO: 0.52 10*3/MM3 (ref 0.1–0.9)
MONOCYTES NFR BLD AUTO: 5.7 % (ref 5–12)
NEUTROPHILS NFR BLD AUTO: 5.8 10*3/MM3 (ref 1.7–7)
NEUTROPHILS NFR BLD AUTO: 63.7 % (ref 42.7–76)
NRBC BLD AUTO-RTO: 0 /100 WBC (ref 0–0.2)
PLATELET # BLD AUTO: 284 10*3/MM3 (ref 140–450)
PMV BLD AUTO: 10.7 FL (ref 6–12)
POTASSIUM SERPL-SCNC: 4.8 MMOL/L (ref 3.5–5.2)
PROT SERPL-MCNC: 6.7 G/DL (ref 6–8.5)
QT INTERVAL: 354 MS
QTC INTERVAL: 433 MS
RBC # BLD AUTO: 4.35 10*6/MM3 (ref 3.77–5.28)
SODIUM SERPL-SCNC: 139 MMOL/L (ref 136–145)
WBC # BLD AUTO: 9.12 10*3/MM3 (ref 3.4–10.8)

## 2021-06-04 PROCEDURE — 93005 ELECTROCARDIOGRAM TRACING: CPT

## 2021-06-04 PROCEDURE — 85025 COMPLETE CBC W/AUTO DIFF WBC: CPT

## 2021-06-04 PROCEDURE — 93010 ELECTROCARDIOGRAM REPORT: CPT | Performed by: INTERNAL MEDICINE

## 2021-06-04 PROCEDURE — C9803 HOPD COVID-19 SPEC COLLECT: HCPCS

## 2021-06-04 PROCEDURE — 80053 COMPREHEN METABOLIC PANEL: CPT

## 2021-06-04 PROCEDURE — 36415 COLL VENOUS BLD VENIPUNCTURE: CPT

## 2021-06-04 PROCEDURE — U0004 COV-19 TEST NON-CDC HGH THRU: HCPCS

## 2021-06-04 NOTE — PAT
Patient to apply Chlorhexadine wipes  to surgical area (as instructed) the night before procedure and the AM of procedure. Wipes provided.    Patient instructed to drink 20 ounces (or until full) of Gatorade and it needs to be completed 1 hour before given arrival time for procedure (NO RED Gatorade)    Patient verbalized understanding.    Per Anesthesia Request, patient instructed not to take their ACE/ARB medications on the AM of surgery.    Covid test after PAT.

## 2021-06-05 LAB — SARS-COV-2 RNA NOSE QL NAA+PROBE: NOT DETECTED

## 2021-06-07 ENCOUNTER — HOSPITAL ENCOUNTER (OUTPATIENT)
Facility: HOSPITAL | Age: 61
Setting detail: HOSPITAL OUTPATIENT SURGERY
Discharge: HOME | End: 2021-06-07
Attending: NEUROLOGICAL SURGERY | Admitting: NEUROLOGICAL SURGERY

## 2021-06-07 ENCOUNTER — ANESTHESIA EVENT (OUTPATIENT)
Dept: PERIOP | Facility: HOSPITAL | Age: 61
End: 2021-06-07

## 2021-06-07 ENCOUNTER — ANESTHESIA (OUTPATIENT)
Dept: PERIOP | Facility: HOSPITAL | Age: 61
End: 2021-06-07

## 2021-06-07 VITALS
RESPIRATION RATE: 16 BRPM | DIASTOLIC BLOOD PRESSURE: 79 MMHG | SYSTOLIC BLOOD PRESSURE: 145 MMHG | OXYGEN SATURATION: 99 % | TEMPERATURE: 98 F | HEART RATE: 84 BPM

## 2021-06-07 DIAGNOSIS — M50.00 HERNIATED NUCLEUS PULPOSUS WITH MYELOPATHY, CERVICAL: ICD-10-CM

## 2021-06-07 DIAGNOSIS — G56.03 BILATERAL CARPAL TUNNEL SYNDROME: ICD-10-CM

## 2021-06-07 LAB — GLUCOSE BLDC GLUCOMTR-MCNC: 177 MG/DL (ref 70–130)

## 2021-06-07 PROCEDURE — 25010000002 PROPOFOL 10 MG/ML EMULSION: Performed by: NURSE ANESTHETIST, CERTIFIED REGISTERED

## 2021-06-07 PROCEDURE — 25010000003 LIDOCAINE 1 % SOLUTION 20 ML VIAL: Performed by: NEUROLOGICAL SURGERY

## 2021-06-07 PROCEDURE — 82962 GLUCOSE BLOOD TEST: CPT

## 2021-06-07 PROCEDURE — 64721 CARPAL TUNNEL SURGERY: CPT | Performed by: NEUROLOGICAL SURGERY

## 2021-06-07 PROCEDURE — 25010000003 CEFAZOLIN IN DEXTROSE 2-4 GM/100ML-% SOLUTION: Performed by: PHYSICIAN ASSISTANT

## 2021-06-07 PROCEDURE — 25010000002 FENTANYL CITRATE (PF) 50 MCG/ML SOLUTION: Performed by: NURSE ANESTHETIST, CERTIFIED REGISTERED

## 2021-06-07 PROCEDURE — 25010000002 ONDANSETRON PER 1 MG: Performed by: NURSE ANESTHETIST, CERTIFIED REGISTERED

## 2021-06-07 RX ORDER — SODIUM CHLORIDE, SODIUM LACTATE, POTASSIUM CHLORIDE, CALCIUM CHLORIDE 600; 310; 30; 20 MG/100ML; MG/100ML; MG/100ML; MG/100ML
1000 INJECTION, SOLUTION INTRAVENOUS CONTINUOUS
Status: DISCONTINUED | OUTPATIENT
Start: 2021-06-07 | End: 2021-06-07 | Stop reason: HOSPADM

## 2021-06-07 RX ORDER — CHOLECALCIFEROL (VITAMIN D3) 125 MCG
10 CAPSULE ORAL NIGHTLY PRN
COMMUNITY

## 2021-06-07 RX ORDER — CEFAZOLIN SODIUM 2 G/100ML
2 INJECTION, SOLUTION INTRAVENOUS ONCE
Status: COMPLETED | OUTPATIENT
Start: 2021-06-07 | End: 2021-06-07

## 2021-06-07 RX ORDER — SODIUM CHLORIDE 0.9 % (FLUSH) 0.9 %
3-10 SYRINGE (ML) INJECTION AS NEEDED
Status: DISCONTINUED | OUTPATIENT
Start: 2021-06-07 | End: 2021-06-07 | Stop reason: HOSPADM

## 2021-06-07 RX ORDER — FENTANYL CITRATE 50 UG/ML
50 INJECTION, SOLUTION INTRAMUSCULAR; INTRAVENOUS
Status: DISCONTINUED | OUTPATIENT
Start: 2021-06-07 | End: 2021-06-07 | Stop reason: HOSPADM

## 2021-06-07 RX ORDER — MAGNESIUM HYDROXIDE 1200 MG/15ML
LIQUID ORAL AS NEEDED
Status: DISCONTINUED | OUTPATIENT
Start: 2021-06-07 | End: 2021-06-07 | Stop reason: HOSPADM

## 2021-06-07 RX ORDER — LIDOCAINE HYDROCHLORIDE 10 MG/ML
0.5 INJECTION, SOLUTION EPIDURAL; INFILTRATION; INTRACAUDAL; PERINEURAL ONCE AS NEEDED
Status: COMPLETED | OUTPATIENT
Start: 2021-06-07 | End: 2021-06-07

## 2021-06-07 RX ORDER — FAMOTIDINE 20 MG/1
20 TABLET, FILM COATED ORAL
Status: DISCONTINUED | OUTPATIENT
Start: 2021-06-07 | End: 2021-06-07 | Stop reason: SDUPTHER

## 2021-06-07 RX ORDER — NALOXONE HCL 0.4 MG/ML
0.4 VIAL (ML) INJECTION AS NEEDED
Status: DISCONTINUED | OUTPATIENT
Start: 2021-06-07 | End: 2021-06-07 | Stop reason: HOSPADM

## 2021-06-07 RX ORDER — SODIUM CHLORIDE 0.9 % (FLUSH) 0.9 %
3 SYRINGE (ML) INJECTION EVERY 12 HOURS SCHEDULED
Status: DISCONTINUED | OUTPATIENT
Start: 2021-06-07 | End: 2021-06-07 | Stop reason: HOSPADM

## 2021-06-07 RX ORDER — SODIUM CHLORIDE, SODIUM LACTATE, POTASSIUM CHLORIDE, CALCIUM CHLORIDE 600; 310; 30; 20 MG/100ML; MG/100ML; MG/100ML; MG/100ML
9 INJECTION, SOLUTION INTRAVENOUS CONTINUOUS
Status: DISCONTINUED | OUTPATIENT
Start: 2021-06-07 | End: 2021-06-07 | Stop reason: HOSPADM

## 2021-06-07 RX ORDER — FAMOTIDINE 10 MG/ML
20 INJECTION, SOLUTION INTRAVENOUS ONCE
Status: DISCONTINUED | OUTPATIENT
Start: 2021-06-07 | End: 2021-06-07 | Stop reason: HOSPADM

## 2021-06-07 RX ORDER — HYDROCODONE BITARTRATE AND ACETAMINOPHEN 7.5; 325 MG/1; MG/1
1 TABLET ORAL EVERY 8 HOURS PRN
Qty: 15 TABLET | Refills: 0 | Status: SHIPPED | OUTPATIENT
Start: 2021-06-07

## 2021-06-07 RX ORDER — SODIUM CHLORIDE 0.9 % (FLUSH) 0.9 %
10 SYRINGE (ML) INJECTION AS NEEDED
Status: DISCONTINUED | OUTPATIENT
Start: 2021-06-07 | End: 2021-06-07 | Stop reason: HOSPADM

## 2021-06-07 RX ORDER — PROMETHAZINE HYDROCHLORIDE 25 MG/1
25 TABLET ORAL ONCE AS NEEDED
Status: DISCONTINUED | OUTPATIENT
Start: 2021-06-07 | End: 2021-06-07 | Stop reason: HOSPADM

## 2021-06-07 RX ORDER — FAMOTIDINE 20 MG/1
20 TABLET, FILM COATED ORAL ONCE
Status: COMPLETED | OUTPATIENT
Start: 2021-06-07 | End: 2021-06-07

## 2021-06-07 RX ORDER — SODIUM CHLORIDE 0.9 % (FLUSH) 0.9 %
10 SYRINGE (ML) INJECTION EVERY 12 HOURS SCHEDULED
Status: DISCONTINUED | OUTPATIENT
Start: 2021-06-07 | End: 2021-06-07 | Stop reason: HOSPADM

## 2021-06-07 RX ORDER — LABETALOL HYDROCHLORIDE 5 MG/ML
5 INJECTION, SOLUTION INTRAVENOUS
Status: DISCONTINUED | OUTPATIENT
Start: 2021-06-07 | End: 2021-06-07 | Stop reason: HOSPADM

## 2021-06-07 RX ORDER — LIDOCAINE HYDROCHLORIDE 10 MG/ML
INJECTION, SOLUTION EPIDURAL; INFILTRATION; INTRACAUDAL; PERINEURAL AS NEEDED
Status: DISCONTINUED | OUTPATIENT
Start: 2021-06-07 | End: 2021-06-07 | Stop reason: SURG

## 2021-06-07 RX ORDER — HYDROMORPHONE HYDROCHLORIDE 1 MG/ML
0.5 INJECTION, SOLUTION INTRAMUSCULAR; INTRAVENOUS; SUBCUTANEOUS
Status: DISCONTINUED | OUTPATIENT
Start: 2021-06-07 | End: 2021-06-07 | Stop reason: HOSPADM

## 2021-06-07 RX ORDER — MIDAZOLAM HYDROCHLORIDE 1 MG/ML
1 INJECTION INTRAMUSCULAR; INTRAVENOUS
Status: DISCONTINUED | OUTPATIENT
Start: 2021-06-07 | End: 2021-06-07 | Stop reason: HOSPADM

## 2021-06-07 RX ORDER — ONDANSETRON 2 MG/ML
INJECTION INTRAMUSCULAR; INTRAVENOUS AS NEEDED
Status: DISCONTINUED | OUTPATIENT
Start: 2021-06-07 | End: 2021-06-07 | Stop reason: SURG

## 2021-06-07 RX ORDER — HYDRALAZINE HYDROCHLORIDE 20 MG/ML
5 INJECTION INTRAMUSCULAR; INTRAVENOUS
Status: DISCONTINUED | OUTPATIENT
Start: 2021-06-07 | End: 2021-06-07 | Stop reason: HOSPADM

## 2021-06-07 RX ORDER — LIDOCAINE HYDROCHLORIDE 10 MG/ML
0.5 INJECTION, SOLUTION EPIDURAL; INFILTRATION; INTRACAUDAL; PERINEURAL ONCE AS NEEDED
Status: DISCONTINUED | OUTPATIENT
Start: 2021-06-07 | End: 2021-06-07 | Stop reason: HOSPADM

## 2021-06-07 RX ORDER — FENTANYL CITRATE 50 UG/ML
INJECTION, SOLUTION INTRAMUSCULAR; INTRAVENOUS AS NEEDED
Status: DISCONTINUED | OUTPATIENT
Start: 2021-06-07 | End: 2021-06-07 | Stop reason: SURG

## 2021-06-07 RX ORDER — ONDANSETRON 2 MG/ML
4 INJECTION INTRAMUSCULAR; INTRAVENOUS ONCE AS NEEDED
Status: DISCONTINUED | OUTPATIENT
Start: 2021-06-07 | End: 2021-06-07 | Stop reason: HOSPADM

## 2021-06-07 RX ORDER — ONDANSETRON 4 MG/1
4 TABLET, FILM COATED ORAL EVERY 8 HOURS PRN
COMMUNITY

## 2021-06-07 RX ORDER — IPRATROPIUM BROMIDE AND ALBUTEROL SULFATE 2.5; .5 MG/3ML; MG/3ML
3 SOLUTION RESPIRATORY (INHALATION) ONCE AS NEEDED
Status: DISCONTINUED | OUTPATIENT
Start: 2021-06-07 | End: 2021-06-07 | Stop reason: HOSPADM

## 2021-06-07 RX ORDER — HYDROCODONE BITARTRATE AND ACETAMINOPHEN 5; 325 MG/1; MG/1
1 TABLET ORAL ONCE AS NEEDED
Status: DISCONTINUED | OUTPATIENT
Start: 2021-06-07 | End: 2021-06-07 | Stop reason: HOSPADM

## 2021-06-07 RX ADMIN — FENTANYL CITRATE 50 MCG: 50 INJECTION, SOLUTION INTRAMUSCULAR; INTRAVENOUS at 09:07

## 2021-06-07 RX ADMIN — CEFAZOLIN SODIUM 2 G: 10 INJECTION, POWDER, FOR SOLUTION INTRAVENOUS at 09:06

## 2021-06-07 RX ADMIN — LIDOCAINE HYDROCHLORIDE 50 MG: 10 INJECTION, SOLUTION EPIDURAL; INFILTRATION; INTRACAUDAL; PERINEURAL at 09:07

## 2021-06-07 RX ADMIN — FENTANYL CITRATE 50 MCG: 50 INJECTION INTRAMUSCULAR; INTRAVENOUS at 10:25

## 2021-06-07 RX ADMIN — SODIUM CHLORIDE, POTASSIUM CHLORIDE, SODIUM LACTATE AND CALCIUM CHLORIDE 9 ML/HR: 600; 310; 30; 20 INJECTION, SOLUTION INTRAVENOUS at 07:02

## 2021-06-07 RX ADMIN — FAMOTIDINE 20 MG: 20 TABLET, FILM COATED ORAL at 07:20

## 2021-06-07 RX ADMIN — LIDOCAINE HYDROCHLORIDE 0.5 ML: 10 INJECTION, SOLUTION EPIDURAL; INFILTRATION; INTRACAUDAL; PERINEURAL at 07:02

## 2021-06-07 RX ADMIN — FENTANYL CITRATE 50 MCG: 50 INJECTION, SOLUTION INTRAMUSCULAR; INTRAVENOUS at 09:15

## 2021-06-07 RX ADMIN — PROPOFOL 130 MCG/KG/MIN: 10 INJECTION, EMULSION INTRAVENOUS at 09:07

## 2021-06-07 RX ADMIN — ONDANSETRON 4 MG: 2 INJECTION INTRAMUSCULAR; INTRAVENOUS at 09:30

## 2021-06-07 NOTE — OP NOTE
NEUROSURGICAL OPERATIVE NOTE        PREOPERATIVE DIAGNOSIS:    Left carpal tunnel syndrome      POSTOPERATIVE DIAGNOSIS:  Same      PROCEDURE:  Left carpal tunnel release      SURGEON:  Jairo White M.D.      ASSISTANT: None      ANESTHESIA: Local/MAC      ESTIMATED BLOOD LOSS: Minimal      SPECIMEN: None      DRAINS: None      COMPLICATIONS:  None      CLINICAL NOTE:  The patient is a 61-year-old woman with a history of bilateral hand numbness and weakness.  Studies demonstrate bilateral carpal tunnel syndrome.  As such, she presents at this time for stage I which is left carpal tunnel release.  The nature of the procedure as well as the potential risks, complications, limitations, and alternatives to the procedure were discussed at length with the patient and the patient has agreed to proceed with surgery.      TECHNICAL NOTE:  The patient was brought to the operating room and placed on the operating table in supine position. She was provided sedation. A tourniquet was placed about her left upper extremity proximally. Her left upper extremity from the elbow to the fingertips was then prepared and draped in the usual fashion. A linear incision was marked in the let proximal palm in line with the 4th digit. This was infiltrated with 1% lidocaine with sodium bicarbonate. Tourniquet was inflated. Incision was fashioned, transverse carpal ligament was identified and divided and divided sharply with scalpel and tenotomy scissors. The dissection was conducted proximally and then distally until palmar fat was identified. Good decompression of the median nerve was accomplished. The tourniquet was lowered. Modest bleeding points were controlled with bipolar cautery. The wound was washed out with saline solution. The incision was closed in vertical mattress interrupted fashion with 4-0 nylon suture. Sterile dressing was applied. She was subsequently taken to recovery room in satisfactory condition. She did receive  preoperative antibiotics.             Jairo White M.D.

## 2021-06-07 NOTE — H&P
Pre-Op H&P  Medina Younghip  [id number]  1960    Chief complaint: Left hand pain and numbness    HPI:  Patient is a 61 y.o.female who presents with a long history of bilateral carpal tunnel syndrome.  No previous surgery on her hands.  No recent changes in her presenting symptoms or in her general health.     Review of Systems:  General ROS: negative for chills, fever or skin lesions;  No changes since last office visit.  Neg for recent sick exposure  Cardiovascular ROS: no chest pain or dyspnea on exertion, + murmur  Respiratory ROS: no cough, shortness of breath, or wheezing    Allergies:   Allergies   Allergen Reactions   • Statins Myalgia   • Augmentin [Amoxicillin-Pot Clavulanate] Nausea And Vomiting   • Sulfa Antibiotics GI Intolerance       Home Meds:    No current facility-administered medications on file prior to encounter.     Current Outpatient Medications on File Prior to Encounter   Medication Sig Dispense Refill   • acetaminophen (TYLENOL) 500 MG tablet Take 500 mg by mouth Every 6 (Six) Hours As Needed for Mild Pain .     • aspirin 81 MG chewable tablet Chew 81 mg daily.     • cyclobenzaprine (FLEXERIL) 10 MG tablet TAKE 1 TABLET BY MOUTH THREE TIMES A DAY AS NEEDED FOR MUSCLE SPASMS (Patient taking differently: Take 10 mg by mouth 3 (Three) Times a Day As Needed.) 60 tablet 0   • estradiol (ESTRACE) 2 MG tablet Take 2 mg by mouth Daily.     • ezetimibe (ZETIA) 10 MG tablet Take 10 mg by mouth Daily.     • famotidine (PEPCID) 10 MG tablet Take 10 mg by mouth 2 (two) times a day.     • gabapentin (NEURONTIN) 800 MG tablet Take 800 mg by mouth 3 (three) times a day.     • HYDROcodone-acetaminophen (NORCO)  MG per tablet Take 1 tablet by mouth 3 (Three) Times a Day As Needed for Moderate Pain . 20 tablet 0   • HYDROcodone-acetaminophen (NORCO) 7.5-325 MG per tablet Take 1 tablet by mouth Every 8 (Eight) Hours As Needed for Moderate Pain . 25 tablet 0   • insulin glargine (LANTUS) 100  UNIT/ML injection Inject 80 Units under the skin into the appropriate area as directed Every Night.     • Insulin Lispro (HUMALOG SC) Inject  under the skin into the appropriate area as directed 3 (Three) Times a Day. Per sliding scale     • linaclotide (LINZESS) 145 MCG capsule capsule Take 145 mcg by mouth Every Morning Before Breakfast.     • lisinopril (PRINIVIL,ZESTRIL) 40 MG tablet Take 40 mg by mouth Daily.     • metFORMIN (GLUCOPHAGE) 1000 MG tablet Take 1,000 mg by mouth 2 (two) times a day with meals.     • methylPREDNISolone (MEDROL, VLADIMIR,) 4 MG tablet Take as directed on package instructions. 21 tablet 0   • naproxen (NAPROSYN) 375 MG tablet Take 375 mg by mouth 2 (Two) Times a Day As Needed for Mild Pain .     • nitrofurantoin (MACRODANTIN) 50 MG capsule Take 50 mg by mouth every night.     • promethazine (PHENERGAN) 25 MG tablet Take 25 mg by mouth Every 6 (Six) Hours As Needed for Nausea or Vomiting.     • traMADol (ULTRAM) 50 MG tablet Take 1 tablet by mouth Every 6 (Six) Hours As Needed for Moderate Pain . 50 tablet 0   • venlafaxine XR (EFFEXOR-XR) 150 MG 24 hr capsule Take 150 mg by mouth 2 (two) times a day.         PMH:   Past Medical History:   Diagnosis Date   • Anemia    • Anesthesia complication     elevated BP after surgeries   • Anxiety and depression    • Arthritis    • Bipolar 1 disorder (CMS/HCC)    • Diabetes mellitus (CMS/HCC)    • Disease of thyroid gland     nodule on L lobe, PCP monitoring   • GERD (gastroesophageal reflux disease)    • Heart murmur    • History of kidney stones    • Hyperlipidemia    • Hypertension    • Panic attacks    • Pneumonia     last incident 1/2020   • PONV (postoperative nausea and vomiting)    • Rheumatic fever     as a child    • Sleep apnea     wears CPAP sometimes    • UTI (urinary tract infection)     last one 6 months ago     PSH:    Past Surgical History:   Procedure Laterality Date   • ANTERIOR CERVICAL DISCECTOMY W/ FUSION N/A 3/9/2020     Procedure: CERVICAL DISCECTOMY ANTERIOR WITH FUSION C5-7;  Surgeon: Jairo White MD;  Location: [facility];  Service: Neurosurgery;  Laterality: N/A;   • APPENDECTOMY     •  SECTION      x4   • CHOLECYSTECTOMY     • HEMORRHOIDECTOMY     • HYSTERECTOMY     • TONSILLECTOMY         Immunization History:  Influenza: yes  Pneumococcal: yes  Tetanus: no    Social History:   Tobacco:   Social History     Tobacco Use   Smoking Status Never Smoker   Smokeless Tobacco Never Used      Alcohol:     Social History     Substance and Sexual Activity   Alcohol Use No       Vitals:           There were no vitals taken for this visit.    Physical Exam:  General Appearance:    Alert, cooperative, no distress, appears stated age   Head:    Normocephalic, without obvious abnormality, atraumatic   Lungs:     Clear to auscultation bilaterally, respirations unlabored    Heart:   Regular rate and rhythm, S1 and S2 normal, no murmur, rub    or gallop    Abdomen:    Soft, nontender.  +bowel sounds   Breast Exam:    deferred   Genitalia:    deferred   Extremities:   Extremities normal, atraumatic, no cyanosis or edema   Skin:   Skin color, texture, turgor normal, no rashes or lesions   Neurologic:   Grossly intact   Results Review  LABS:  Lab Results   Component Value Date    WBC 9.12 2021    HGB 13.2 2021    HCT 41.3 2021    MCV 94.9 2021     2021    NEUTROABS 5.80 2021    GLUCOSE 161 (H) 2021    BUN 8 2021    CREATININE 0.89 2021    EGFRIFNONA 65 2021     2021    K 4.8 2021     2021    CO2 27.0 2021    CALCIUM 9.7 2021    ALBUMIN 4.20 2021    AST 25 2021    ALT 26 2021    BILITOT 0.8 2021       RADIOLOGY:  No radiology results for the last 3 days     I reviewed the patient's new clinical results. CBC, Chem profile, EKG on chart.  Covid-neg    Cancer Staging (if applicable)  Cancer Patient: __ yes  _x_no __unknown; If yes, clinical stage T:__ N:__M:__, stage group or __N/A    Impression: Left carpal tunnel syndrome    Plan: For left carpal tunnel release today    RAMAN Bermeo   6/7/2021   07:04 EDT

## 2021-06-07 NOTE — ANESTHESIA POSTPROCEDURE EVALUATION
Patient: Medina Rider    Procedure Summary     Date: 06/07/21 [facility] / [facility]    Anesthesia Start: 0906 Anesthesia Stop: 0942    Procedure: CARPAL TUNNEL RELEASE, left (Left Wrist) Diagnosis:       Bilateral carpal tunnel syndrome      (Bilateral carpal tunnel syndrome [G56.03])    Surgeons: Jairo White MD Provider: Jose Martin Coates MD    Anesthesia Type: general ASA Status: 3          Anesthesia Type: general    Vitals  Vitals Value Taken Time   BP     Temp     Pulse 90 06/07/21 0942   Resp     SpO2 99 % 06/07/21 0942   Vitals shown include unvalidated device data.        Post Anesthesia Care and Evaluation    Patient location during evaluation: PACU  Patient participation: complete - patient participated  Level of consciousness: awake and alert  Pain management: adequate  Airway patency: patent  Anesthetic complications: No anesthetic complications  PONV Status: none  Cardiovascular status: hemodynamically stable and acceptable  Respiratory status: nonlabored ventilation, acceptable and nasal cannula  Hydration status: acceptable    Comments: Patient stable and VSS upon arrival to PACU. RN at bedside.

## 2021-06-07 NOTE — ANESTHESIA PREPROCEDURE EVALUATION
Anesthesia Evaluation     Patient summary reviewed and Nursing notes reviewed   history of anesthetic complications: PONV  NPO Solid Status: > 8 hours  NPO Liquid Status: > 8 hours           Airway   Mallampati: II  TM distance: >3 FB  Neck ROM: full  No difficulty expected  Dental      Pulmonary - normal exam   (+) pneumonia , sleep apnea,   Cardiovascular - normal exam    (+) hypertension, valvular problems/murmurs, hyperlipidemia,       Neuro/Psych  (+) numbness, psychiatric history,     GI/Hepatic/Renal/Endo    (+)  GERD,  diabetes mellitus,     Musculoskeletal     Abdominal    Substance History      OB/GYN          Other   arthritis,                      Anesthesia Plan    ASA 3     general     intravenous induction     Anesthetic plan, all risks, benefits, and alternatives have been provided, discussed and informed consent has been obtained with: patient.    Plan discussed with CRNA.

## 2021-06-09 NOTE — PROGRESS NOTES
"Chief Complaint: \"Lower back pain. Pain in my neck and left shoulder. Numbness in my hands.\"        History of Present Illness:   Patient: Ms. Medina Rider, 61 y.o. female   Referring Physician: Dianne Dickinson PA-C  Reason for Referral: Consultation for chronic intractable lower back and neck pain.     Problem #1: Chronic Neck Pain:  Pain History: Patient reports a several year history of progressive posterior cervicalgia, which began without incident.  Patient history is significant for C5-C7 ACDF on 3/9/2020 by Dr. Jairo White.  Patient presented with cervical spondylosis with cord compromise.  Patient reports that after the surgery she continued experiencing neck pain radiating into the left shoulder.  Pain sometimes radiates into the occipital region.  She also complains of numbness in the bilateral median nerve distribution.  She has tried conservative measures for carpal tunnel syndrome without significant benefit.  Patient has failed to obtain pain relief with conservative measures including oral analgesics and physical therapy.  EMG/NCV of the upper extremities revealed moderate to severe bilateral median neuropathy at the wrist, right ulnar neuropathy at the elbow, chronic right C5-C6 and C6-C7 radiculopathies.  Underlying sensorimotor polyneuropathy.  MRI of the cervical spine on 5/14/2021 revealed multilevel spondylosis.  S/p C5 C7 ACDF.  At C5-C6 left paracentral disc osteophyte complex indenting the left hemicord with moderate left paracentral spinal canal stenosis and left greater than right foraminal stenosis.  C6-C7: Disc osteophyte complex with mild to moderate right and mild left foraminal stenosis.  Medina Rider underwent follow-up neurosurgical consultation with Dianne Dickinson PA-C on 05/14/2021, and was found to be a potential surgical candidate for staged carpal tunnel release.  She was found not to be a surgical candidate for her cervical or lumbar issues.  " She underwent left carpal tunnel release on 6/7/2021 with significant improvement. Pain has progressed in intensity over the past months.   Pain Description: Constant pain with intermittent exacerbation, described as dull, aching, burning and throbbing sensation.   Radiation of Pain: The pain radiates from the posterior cervical region into the bilateral occipital region into the left shoulder   Pain intensity today: 6/10  Average pain intensity last week: 6/10  Pain intensity ranges from: 5/10 to 9/10  Aggravating factors: Pain increases with extension, flexion, rotation of the cervical spine.   Alleviating factors: Pain decreases with analgesics, rest, heat  Associated Symptoms:   Patient denies pain, numbness, or weakness in the upper extremities, other than RCT syndrome.   Patient reports difficulties with her balance but denies recent falls.   Patient reports bilateral cervicogenic and occipital headaches almost daily lasting several hours.    Problem #2: Chronic Lower Back Pain:  Pain History: Patient reports a several year history of progressive lower back pain, which began without incident.  Symptoms are usually worse with prolonged standing or walking.  Patient has been having difficulties when cooking a meal for more than a few minutes.  Symptoms tend to decrease when sitting or lying down.  Patient has failed to obtain pain relief with conservative measures including oral analgesics and physical therapy.  MRI of the lumbar spine on 3/18/2021 revealed multilevel spondylitic changes.  At L4-L5 there is a disc bulge combined with ligamentum flavum hypertrophy and facet hypertrophy contributing to moderate canal and bilateral neuroforaminal stenosis. Pain has progressed in intensity over the past months.   Pain Description: Constant pain with intermittent exacerbation, described as aching, burning and throbbing sensation.   Radiation of Pain: The pain does not radiate.  Pain intensity today: 5/10  Average  pain intensity last week: 5/10  Pain intensity ranges from: 3/10 to 9/10  Aggravating factors: Pain increases with bending, twisting, standing, walking. Patient describes neurogenic claudication.    Alleviating factors: Pain decreases with sitting down, lying down   Associated Symptoms:   Patient denies pain, numbness, or weakness in the lower extremities.   Patient denies any new bladder or bowel problems.     Review of previous therapies and additional medical records:  Medina Rider has already failed the following measures, including:   Conservative Measures: Oral analgesics, opioids, topical analgesics, ice, heat, chiropractic therapy, massage, physical therapy   Interventional Measures: Lumbar injections in 2018 with Dr. Madrigal  Surgical Measures: C5-C7 ACDF on 3/9/2020 by Dr. Jairo White. Left carpal tunnel release on 6/7/2021 by Dr. Jairo White.  No history of previous lumbar spine or hip surgery  Medina Rider underwent follow-up neurosurgical consultation with Dianne Dickinson PA-C on 05/14/2021, and was found to be a potential surgical candidate for staged carpal tunnel release.  She was found not to be a surgical candidate for her cervical or lumbar issues.    Medina Rider presents with significant comorbidities including anxiety and depression, bipolar 1 disorder, panic attacks, diabetes mellitus, GERD, history of kidney stones, hyperlipidemia, hypertension, sleep apnea wears a CPAP sometimes  In terms of current analgesics, Medina Rider takes: Acetaminophen, cyclobenzaprine, gabapentin, Naprosyn, Norco, tramadol. Patient also takes Effexor  I have reviewed [facility] Report #[id number] (gabapentin by Meena Red) consistent with medication reconciliation.  SOAPP: Not completed by the patient    Global Pain Scale 06-17 2021          Pain 20          Feelings 11          Clinical outcomes 20          Activities 10          GPS Total: 61            Review of  Diagnostic Studies:    EMG/NCV 12/18/2020.  I have reviewed the report.  Moderate severe bilateral median neuropathy at the wrist  Right ulnar neuropathy at the elbow  Chronic right C5-C6 and C6-7 radiculopathies  Sensorimotor polyneuropathy  MRI of the cervical spine without contrast 5/14/2021.  I have reviewed the images and the radiology report.  S/p C5 C7 ACDF with hardware in place with artifact.  Vertebral body heights and alignment are preserved.  Cervicomedullary junction is normal.  Spinal cord caliber and signal are preserved.  Diffuse spondylosis.  Axial imaging:  C3-C4: Right lateralizing disc osteophyte complex, uncovertebral hypertrophy contributing to mild right paracentral thecal sac effacement and canal stenosis with mild right neuroforaminal stenosis  C4-C5: Circumferential disc bulge with left paracentral predominance, uncovertebral hypertrophy contributing to mild left paracentral spinal canal stenosis and mild bilateral foraminal stenosis  C5-C6: Disc osteophyte complex with left paracentral predominance indenting the left hemicord, uncovertebral hypertrophy contributing to moderate bilateral neuroforaminal stenosis left greater than right  C6-C7: Disc osteophyte complex, uncovertebral hypertrophy moderate right and mild left foraminal stenosis  C7-T1: No significant stenosis  MRI of the lumbar spine without contrast 3/18/2021.  I have reviewed the images and the radiology report.  Vertebral body heights and alignment are preserved.  The conus medullaris has an unremarkable appearance.  Axial imaging:  L1-L2, L2-L3, disc bulge.  No significant canal or foraminal stenosis  L3-L4: Disc bulge, ligamentum flavum hypertrophy, facet hypertrophy, mild canal stenosis, mild right and moderate left foraminal stenosis  L4-L5: Disc bulge, ligamentum flavum hypertrophy, facet hypertrophy, moderate left paracentral spinal canal stenosis and left neuroforaminal stenosis with moderate right foraminal  stenosis  L5-S1: Disc bulge, facet hypertrophy.  Mild canal and mild foraminal stenosis        Review of Systems   Constitutional: Positive for activity change.   HENT: Positive for dental problem.    Respiratory: Positive for apnea.    Musculoskeletal: Positive for arthralgias, back pain, myalgias, neck pain and neck stiffness.   Neurological: Positive for dizziness, weakness, light-headedness, numbness and headaches.   All other systems reviewed and are negative.        Patient Active Problem List   Diagnosis   • Herniation of cervical intervertebral disc with radiculopathy   • Bilateral carpal tunnel syndrome   • Lumbar stenosis with neurogenic claudication   • Degeneration of lumbar or lumbosacral intervertebral disc   • Spondylosis of lumbar region without myelopathy or radiculopathy   • History of fusion of cervical spine   • Cervical spinal stenosis   • Cervical spondylosis without myelopathy   • Insulin dependent diabetes mellitus type IA (CMS/HCC)   • Moderate obesity   • Anxiety and depression   • Gait disturbance   • Physical deconditioning   • CON on CPAP       Past Medical History:   Diagnosis Date   • Anemia    • Anesthesia complication     elevated BP after surgeries   • Anxiety and depression    • Arthritis    • Bipolar 1 disorder (CMS/HCC)    • Diabetes mellitus (CMS/HCC)    • Disease of thyroid gland     nodule on L lobe, PCP monitoring   • GERD (gastroesophageal reflux disease)    • Heart murmur    • History of kidney stones    • History of positive PPD     clear chest x-rays (unable to take meds r/t liver intolerance)   • Hyperlipidemia    • Hypertension    • Panic attacks    • Pneumonia     last incident 1/2020   • PONV (postoperative nausea and vomiting)    • Rheumatic fever     as a child    • Sleep apnea     wears CPAP sometimes    • UTI (urinary tract infection)     last one 6 months ago         Past Surgical History:   Procedure Laterality Date   • ANTERIOR CERVICAL DISCECTOMY W/ FUSION N/A  3/9/2020    Procedure: CERVICAL DISCECTOMY ANTERIOR WITH FUSION C5-7;  Surgeon: Jairo White MD;  Location:  [facility];  Service: Neurosurgery;  Laterality: N/A;   • APPENDECTOMY     • CARPAL TUNNEL RELEASE Left 2021    Procedure: CARPAL TUNNEL RELEASE LEFT;  Surgeon: Jairo White MD;  Location:  [facility];  Service: Neurosurgery;  Laterality: Left;   •  SECTION      x4   • CHOLECYSTECTOMY     • HEMORRHOIDECTOMY     • HYSTERECTOMY     • TONSILLECTOMY           Family History   Problem Relation Age of Onset   • Heart disease Mother    • Diabetes Father    • Heart disease Father    • Heart disease Sister    • Cancer Brother          Social History     Socioeconomic History   • Marital status:      Spouse name: Not on file   • Number of children: Not on file   • Years of education: Not on file   • Highest education level: Not on file   Tobacco Use   • Smoking status: Never Smoker   • Smokeless tobacco: Never Used   Substance and Sexual Activity   • Alcohol use: No   • Drug use: No   • Sexual activity: Defer           Current Outpatient Medications:   •  acetaminophen (TYLENOL) 500 MG tablet, Take 500 mg by mouth Every 6 (Six) Hours As Needed for Mild Pain ., Disp: , Rfl:   •  aspirin 81 MG chewable tablet, Chew 81 mg daily., Disp: , Rfl:   •  cyclobenzaprine (FLEXERIL) 10 MG tablet, TAKE 1 TABLET BY MOUTH THREE TIMES A DAY AS NEEDED FOR MUSCLE SPASMS (Patient taking differently: Take 10 mg by mouth 3 (Three) Times a Day As Needed.), Disp: 60 tablet, Rfl: 0  •  estradiol (ESTRACE) 2 MG tablet, Take 2 mg by mouth Daily., Disp: , Rfl:   •  ezetimibe (ZETIA) 10 MG tablet, Take 10 mg by mouth Daily., Disp: , Rfl:   •  famotidine (PEPCID) 10 MG tablet, Take 10 mg by mouth 2 (two) times a day., Disp: , Rfl:   •  gabapentin (NEURONTIN) 800 MG tablet, Take 800 mg by mouth 3 (three) times a day., Disp: , Rfl:   •  HYDROcodone-acetaminophen (NORCO)  MG per tablet, Take 1 tablet by mouth 3 (Three)  Times a Day As Needed for Moderate Pain ., Disp: 20 tablet, Rfl: 0  •  HYDROcodone-acetaminophen (NORCO) 7.5-325 MG per tablet, Take 1 tablet by mouth Every 8 (Eight) Hours As Needed for Moderate Pain ., Disp: 15 tablet, Rfl: 0  •  insulin glargine (LANTUS) 100 UNIT/ML injection, Inject 80 Units under the skin into the appropriate area as directed Every Night., Disp: , Rfl:   •  Insulin Lispro (HUMALOG SC), Inject  under the skin into the appropriate area as directed 3 (Three) Times a Day. Per sliding scale, Disp: , Rfl:   •  linaclotide (LINZESS) 145 MCG capsule capsule, Take 145 mcg by mouth Every Morning Before Breakfast., Disp: , Rfl:   •  lisinopril (PRINIVIL,ZESTRIL) 40 MG tablet, Take 40 mg by mouth Daily., Disp: , Rfl:   •  melatonin 5 MG tablet tablet, Take 10 mg by mouth At Night As Needed., Disp: , Rfl:   •  metFORMIN (GLUCOPHAGE) 1000 MG tablet, Take 1,000 mg by mouth 2 (two) times a day with meals., Disp: , Rfl:   •  naproxen (NAPROSYN) 375 MG tablet, Take 375 mg by mouth 2 (Two) Times a Day As Needed for Mild Pain ., Disp: , Rfl:   •  nitrofurantoin (MACRODANTIN) 50 MG capsule, Take 50 mg by mouth every night., Disp: , Rfl:   •  ondansetron (ZOFRAN) 4 MG tablet, Take 4 mg by mouth Every 8 (Eight) Hours As Needed for Nausea or Vomiting., Disp: , Rfl:   •  promethazine (PHENERGAN) 25 MG tablet, Take 25 mg by mouth Every 6 (Six) Hours As Needed for Nausea or Vomiting., Disp: , Rfl:   •  traMADol (ULTRAM) 50 MG tablet, Take 1 tablet by mouth Every 6 (Six) Hours As Needed for Moderate Pain ., Disp: 50 tablet, Rfl: 0  •  venlafaxine XR (EFFEXOR-XR) 150 MG 24 hr capsule, Take 150 mg by mouth 2 (two) times a day., Disp: , Rfl:       Allergies   Allergen Reactions   • Statins Myalgia   • Augmentin [Amoxicillin-Pot Clavulanate] Nausea And Vomiting   • Sulfa Antibiotics GI Intolerance         /80 (BP Location: Right arm, Patient Position: Sitting, Cuff Size: Adult)   Pulse 97   Temp 97.1 °F (36.2 °C)    "Ht 167.6 cm (66\")   Wt 107 kg (235 lb 9.6 oz)   SpO2 98%   BMI 38.03 kg/m²       Physical Exam:  Constitutional: Patient appears well-developed, well-nourished, well-hydrated  HEENT: Head: Normocephalic and atraumatic  Eyes: Conjunctivae and lids are normal  Pupils: Equal, round, reactive to light  Neck: Trachea normal. Neck supple. No JVD present.   Lymphatic: No cervical adenopathy  Pulmonary: No increased work of breathing or signs of respiratory distress. Auscultation of lungs: Clear to auscultation.   Musculoskeletal   Gait and station: Gait evaluation demonstrated a normal gait. Able to walk on heels, toes, tandem walking   Cervical spine: Passive and active range of motion are limited secondary to pain. Extension, flexion, lateral flexion, rotation of the cervical spine increased and reproduced pain. Cervical facet joint loading maneuvers are positive.  Muscles: Presence of active trigger points at the levator scapulae   Shoulders: The range of motion of the glenohumeral joints is full and without pain. Rotator cuff strength is 5/5.   Lumbar spine: Passive and active range of motion are limited secondary to pain. Extension, flexion, lateral flexion, rotation of the lumbar spine increased and reproduced pain. Lumbar facet joint loading maneuvers are positive.  Andrea test and Gaenslen's test are negative   Piriformis maneuvers are negative   Palpation of the bilateral greater trochanter, unrevealing   Examination of the Iliotibial band: unrevealing   Hip joints: The range of motion of the hip joints is almost full and without pain   Neurological:   Patient is alert and oriented to person, place, and time.   Speech: Normal.   Cortical function: Normal mental status.   Cranial nerves 2-12: intact.   Reflex Scores:  Right brachioradialis: 2+  Left brachioradialis: 2+  Right biceps: 2+  Left biceps: 2+  Right triceps: 2+  Left triceps: 2+  Right patellar: 1+  Left patellar: 1+  Right Achilles: 1+  Left " Achilles: 1+  Motor strength: 5/5  Motor Tone: Normal .   Involuntary movements: None.   Superficial/Primitive Reflexes: Primitive reflexes were absent.   Right Hurtado: Absent  Left Hurtado: Absent  Right ankle clonus: Absent  Left ankle clonus: Absent   Babinsky: Absent  Spurling sign: Negative. Neck tornado test: Negative. Lhermitte sign: Negative. Long tract signs: Negative. Straight leg raising test: Negative.   Sensory exam: Intact to light touch, intact pain and temperature sensation, intact vibration sensation and normal proprioception.   Coordination: Normal finger to nose and heel to shin. Normal balance and negative Romberg's sign   Skin and subcutaneous tissue: Skin is warm and intact. No rash noted. No cyanosis.   Psychiatric: Judgment and insight: Normal. Orientation to person, place and time: Normal. Recent and remote memory: Intact. Mood and affect: Normal.     ASSESSMENT:   1. Lumbar stenosis with neurogenic claudication    2. Degeneration of lumbar or lumbosacral intervertebral disc    3. Spondylosis of lumbar region without myelopathy or radiculopathy    4. Herniation of cervical intervertebral disc with radiculopathy    5. Cervical spinal stenosis    6. Cervical disc displacement    7. Cervical spondylosis without myelopathy    8. History of fusion of cervical spine    9. Bilateral carpal tunnel syndrome    10. Insulin dependent diabetes mellitus type IA (CMS/HCC)    11. Moderate obesity    12. CON on CPAP    13. Anxiety and depression    14. Gait disturbance    15. Physical deconditioning          PLAN/MEDICAL DECISION MAKING:  Patient reports a several year history of progressive lower back pain, which began without incident.  Symptoms are usually worse with prolonged standing or walking.  Patient has been having difficulties when cooking a meal even for more than a few minutes. Symptoms tend to decrease when sitting or lying down.  Patient has failed to obtain pain relief with conservative  measures including oral analgesics and physical therapy.  MRI of the lumbar spine on 3/18/2021 revealed multilevel spondylitic changes.  At L4-L5 there is a disc bulge combined with ligamentum flavum hypertrophy and facet hypertrophy contributing to moderate canal and bilateral neuroforaminal stenosis. Pain has progressed in intensity over the past months. Patient also has a history of progressive posterior cervicalgia. She underwent C5-C7 ACDF on 3/9/2020 by Dr. Jairo White. Patient reports that after the surgery she continued experiencing neck pain radiating into the left shoulder. Pain sometimes radiates into the occipital region. EMG/NCV of the upper extremities revealed moderate to severe bilateral median neuropathy at the wrist, right ulnar neuropathy at the elbow, chronic right C5-C6 and C6-C7 radiculopathies.  Underlying sensorimotor polyneuropathy.  MRI of the cervical spine on 5/14/2021 revealed multilevel spondylosis.  S/p C5 C7 ACDF.  At C5-C6 left paracentral disc osteophyte complex indenting the left hemicord with moderate left paracentral spinal canal stenosis and left greater than right foraminal stenosis.  C6-C7: Disc osteophyte complex with mild to moderate right and mild left foraminal stenosis.  Medina Rider underwent follow-up neurosurgical consultation with Dianne Dickinson PA-C on 05/14/2021, and was found to be a potential surgical candidate for staged carpal tunnel release.  She was found not to be a surgical candidate for her cervical or lumbar issues.  She underwent left carpal tunnel release on 6/7/2021 with significant improvement. Pain has progressed in intensity over the past months. Patient has failed to obtain pain relief with conservative measures and previous interventional pain management measures,  as referenced above. I have reviewed all available patient's medical records as well as previous therapies as referenced above. I had a lengthy conversation with Ms.  Medina Rider regarding her chronic pain condition and potential therapeutic options including risks, benefits, alternative therapies, to name a few. Therefore, I have proposed the following plan:  1. Diagnostic studies: Patient will be a candidate for cervical and lumbar myelogram followed by CT postmyelogram if she continues to struggle with pain  2. Pharmacological measures: Reviewed and discussed; Patient takes acetaminophen, cyclobenzaprine, gabapentin, Naprosyn, Norco, tramadol. Patient also takes Effexor. Patient has declined additional pharmacological measures   3. Interventional pain management measures:  A.  Treatment of chronic lower back pain/spinal stenosis: Patient will be scheduled for diagnostic and therapeutic bilateral L4-5 transforaminal epidural steroid injections.  We may repeat epidural depending on patient's outcome.  I have also discussed with the patient possibility of diagnostic bilateral lumbar medial branch blocks at L2, L3, L4; for bilateral lumbar facet joints at L3-L4, L4-L5, to clarify the origin of chronic refractory mechanical lower back pain. If patient experiences more than 80% relief along with significant improvement the range of motion of the lumbar spine, then, patient will be scheduled for a second set of diagnostic bilateral lumbar medial branch blocks, to then, proceed with bilateral lumbar medial branch rhizotomies  B.  Treatment of chronic cervicalgia: I have discussed with the patient the possibility of cervical epidural steroid injection by left foraminal interlaminar approach.  We could repeat the procedure or move forward with selective epidural steroid injection.  Patient also has some elements of mechanical pain of the cervical spine.  4. Long-term rehabilitation efforts:  A. The patient does not have a history of falls. I did complete a risk assessment for falls  B. Patient will start a comprehensive physical therapy program for therapeutic exercise, upper  body strengthening/posture correction, core strengthening, gait and balance training, neurodynamics, myofascial release, cupping and dry needling   C. Start an exercise program such as water therapy  D. Contrast therapy: Apply ice-packs for 15-20 minutes, followed by heating pads for 15-20 minutes to affected area   E. Referral to [facility]. Patient counseled on the importance of weight loss to help with overall health and pain control. Patient instructed to attempt weight loss.    5. The patient has been instructed to contact my office with any questions or difficulties. The patient understands the plan and agrees to proceed accordingly.        Patient Care Team:  Susie Reeder MD as PCP - General (Internal Medicine)  Caldwell, STEVEN Gallardo as Referring Physician (Family Medicine)     No orders of the defined types were placed in this encounter.        No future appointments.      Ludwin Chavarria MD     EMR Dragon/Transcription disclaimer:  Much of this encounter note is an electronic transcription of spoken language to printed text. Electronic transcription of spoken language may permit erroneous, or at times, nonsensical words or phrases to be inadvertently transcribed. Although I have reviewed the note for such errors, some may still exist.

## 2021-06-13 PROBLEM — E10.9 INSULIN DEPENDENT DIABETES MELLITUS TYPE IA: Status: ACTIVE | Noted: 2021-06-13

## 2021-06-13 PROBLEM — F41.9 ANXIETY AND DEPRESSION: Status: ACTIVE | Noted: 2021-06-13

## 2021-06-13 PROBLEM — M51.37 DEGENERATION OF LUMBAR OR LUMBOSACRAL INTERVERTEBRAL DISC: Status: ACTIVE | Noted: 2021-06-13

## 2021-06-13 PROBLEM — E66.8 MODERATE OBESITY: Status: ACTIVE | Noted: 2021-06-13

## 2021-06-13 PROBLEM — M47.812 CERVICAL SPONDYLOSIS WITHOUT MYELOPATHY: Status: ACTIVE | Noted: 2021-06-13

## 2021-06-13 PROBLEM — R26.9 GAIT DISTURBANCE: Status: ACTIVE | Noted: 2021-06-13

## 2021-06-13 PROBLEM — Z99.89 OSA ON CPAP: Status: ACTIVE | Noted: 2021-06-13

## 2021-06-13 PROBLEM — M48.062 LUMBAR STENOSIS WITH NEUROGENIC CLAUDICATION: Status: ACTIVE | Noted: 2021-06-13

## 2021-06-13 PROBLEM — M48.02 CERVICAL SPINAL STENOSIS: Status: ACTIVE | Noted: 2021-06-13

## 2021-06-13 PROBLEM — R53.81 PHYSICAL DECONDITIONING: Status: ACTIVE | Noted: 2021-06-13

## 2021-06-13 PROBLEM — F32.A ANXIETY AND DEPRESSION: Status: ACTIVE | Noted: 2021-06-13

## 2021-06-13 PROBLEM — Z98.1 HISTORY OF FUSION OF CERVICAL SPINE: Status: ACTIVE | Noted: 2021-06-13

## 2021-06-13 PROBLEM — G47.33 OSA ON CPAP: Status: ACTIVE | Noted: 2021-06-13

## 2021-06-13 PROBLEM — M47.816 SPONDYLOSIS OF LUMBAR REGION WITHOUT MYELOPATHY OR RADICULOPATHY: Status: ACTIVE | Noted: 2021-06-13

## 2021-06-15 ENCOUNTER — OFFICE VISIT (OUTPATIENT)
Dept: NEUROSURGERY | Facility: CLINIC | Age: 61
End: 2021-06-15

## 2021-06-15 VITALS
HEIGHT: 66 IN | TEMPERATURE: 97.6 F | BODY MASS INDEX: 37.93 KG/M2 | SYSTOLIC BLOOD PRESSURE: 132 MMHG | DIASTOLIC BLOOD PRESSURE: 64 MMHG | WEIGHT: 236 LBS

## 2021-06-15 DIAGNOSIS — G56.03 BILATERAL CARPAL TUNNEL SYNDROME: Primary | ICD-10-CM

## 2021-06-15 PROCEDURE — 99024 POSTOP FOLLOW-UP VISIT: CPT | Performed by: PHYSICIAN ASSISTANT

## 2021-06-15 NOTE — PROGRESS NOTES
Patient: Medina Rider  : 1960  Chart #: [id number]    Date of Service: 06/15/2021    CHIEF COMPLAINT: Left carpal tunnel syndrome    History of Present Illness Ms. Rider is a 61-year-old woman with a history of bilateral hand pain, numbness and weakness.  Electrodiagnostic studies confirmed bilateral carpal tunnel syndrome.  As such on 2021 she presented for left carpal tunnel release.  Today she is 8 days post-op and presents for suture removal. She continues with sensory alteration in her hand. She admits she has noticed modest improvements and is looking forward to having surgery on the right. No incisional issues.       Past Medical History:   Diagnosis Date   • Anemia    • Anesthesia complication     elevated BP after surgeries   • Anxiety and depression    • Arthritis    • Bipolar 1 disorder (CMS/HCC)    • Diabetes mellitus (CMS/HCC)    • Disease of thyroid gland     nodule on L lobe, PCP monitoring   • GERD (gastroesophageal reflux disease)    • Heart murmur    • History of kidney stones    • History of positive PPD     clear chest x-rays (unable to take meds r/t liver intolerance)   • Hyperlipidemia    • Hypertension    • Panic attacks    • Pneumonia     last incident 2020   • PONV (postoperative nausea and vomiting)    • Rheumatic fever     as a child    • Sleep apnea     wears CPAP sometimes    • UTI (urinary tract infection)     last one 6 months ago         Current Outpatient Medications:   •  acetaminophen (TYLENOL) 500 MG tablet, Take 500 mg by mouth Every 6 (Six) Hours As Needed for Mild Pain ., Disp: , Rfl:   •  aspirin 81 MG chewable tablet, Chew 81 mg daily., Disp: , Rfl:   •  cyclobenzaprine (FLEXERIL) 10 MG tablet, TAKE 1 TABLET BY MOUTH THREE TIMES A DAY AS NEEDED FOR MUSCLE SPASMS (Patient taking differently: Take 10 mg by mouth 3 (Three) Times a Day As Needed.), Disp: 60 tablet, Rfl: 0  •  estradiol (ESTRACE) 2 MG tablet, Take 2 mg by mouth Daily., Disp: , Rfl:   •   ezetimibe (ZETIA) 10 MG tablet, Take 10 mg by mouth Daily., Disp: , Rfl:   •  famotidine (PEPCID) 10 MG tablet, Take 10 mg by mouth 2 (two) times a day., Disp: , Rfl:   •  gabapentin (NEURONTIN) 800 MG tablet, Take 800 mg by mouth 3 (three) times a day., Disp: , Rfl:   •  HYDROcodone-acetaminophen (NORCO)  MG per tablet, Take 1 tablet by mouth 3 (Three) Times a Day As Needed for Moderate Pain ., Disp: 20 tablet, Rfl: 0  •  HYDROcodone-acetaminophen (NORCO) 7.5-325 MG per tablet, Take 1 tablet by mouth Every 8 (Eight) Hours As Needed for Moderate Pain ., Disp: 15 tablet, Rfl: 0  •  insulin glargine (LANTUS) 100 UNIT/ML injection, Inject 80 Units under the skin into the appropriate area as directed Every Night., Disp: , Rfl:   •  Insulin Lispro (HUMALOG SC), Inject  under the skin into the appropriate area as directed 3 (Three) Times a Day. Per sliding scale, Disp: , Rfl:   •  linaclotide (LINZESS) 145 MCG capsule capsule, Take 145 mcg by mouth Every Morning Before Breakfast., Disp: , Rfl:   •  lisinopril (PRINIVIL,ZESTRIL) 40 MG tablet, Take 40 mg by mouth Daily., Disp: , Rfl:   •  melatonin 5 MG tablet tablet, Take 10 mg by mouth At Night As Needed., Disp: , Rfl:   •  metFORMIN (GLUCOPHAGE) 1000 MG tablet, Take 1,000 mg by mouth 2 (two) times a day with meals., Disp: , Rfl:   •  naproxen (NAPROSYN) 375 MG tablet, Take 375 mg by mouth 2 (Two) Times a Day As Needed for Mild Pain ., Disp: , Rfl:   •  nitrofurantoin (MACRODANTIN) 50 MG capsule, Take 50 mg by mouth every night., Disp: , Rfl:   •  ondansetron (ZOFRAN) 4 MG tablet, Take 4 mg by mouth Every 8 (Eight) Hours As Needed for Nausea or Vomiting., Disp: , Rfl:   •  promethazine (PHENERGAN) 25 MG tablet, Take 25 mg by mouth Every 6 (Six) Hours As Needed for Nausea or Vomiting., Disp: , Rfl:   •  traMADol (ULTRAM) 50 MG tablet, Take 1 tablet by mouth Every 6 (Six) Hours As Needed for Moderate Pain ., Disp: 50 tablet, Rfl: 0  •  venlafaxine XR (EFFEXOR-XR) 150  "MG 24 hr capsule, Take 150 mg by mouth 2 (two) times a day., Disp: , Rfl:     Past Surgical History:   Procedure Laterality Date   • ANTERIOR CERVICAL DISCECTOMY W/ FUSION N/A 3/9/2020    Procedure: CERVICAL DISCECTOMY ANTERIOR WITH FUSION C5-7;  Surgeon: Jairo White MD;  Location: [facility] OR;  Service: Neurosurgery;  Laterality: N/A;   • APPENDECTOMY     • CARPAL TUNNEL RELEASE Left 2021    Procedure: CARPAL TUNNEL RELEASE LEFT;  Surgeon: Jairo White MD;  Location: [facility] OR;  Service: Neurosurgery;  Laterality: Left;   •  SECTION      x4   • CHOLECYSTECTOMY     • HEMORRHOIDECTOMY     • HYSTERECTOMY     • TONSILLECTOMY         Social History     Socioeconomic History   • Marital status:      Spouse name: Not on file   • Number of children: Not on file   • Years of education: Not on file   • Highest education level: Not on file   Tobacco Use   • Smoking status: Never Smoker   • Smokeless tobacco: Never Used   Substance and Sexual Activity   • Alcohol use: No   • Drug use: No   • Sexual activity: Defer         Review of Systems   Musculoskeletal: Positive for neck pain.   Neurological: Positive for dizziness and numbness.   All other systems reviewed and are negative.      Objective   Vital Signs: Blood pressure 132/64, temperature 97.6 °F (36.4 °C), height 167.6 cm (66\"), weight 107 kg (236 lb), not currently breastfeeding.  Physical Exam  Vitals and nursing note reviewed.   Constitutional:       General: She is not in acute distress.     Appearance: She is well-developed.   Eyes:      Pupils: Pupils are equal, round, and reactive to light.   Skin:     Comments: Nylon sutures were removed from left wrist incision and incision remained intact. No evidence of wound dehiscence. No evidence of infection    Psychiatric:         Behavior: Behavior normal.         Thought Content: Thought content normal.         Assessment/Plan   Diagnosis: Bilateral carpal tunnel syndrome status post left " carpal tunnel release    Medical Decision Making: Patient is doing well. She reports modest improvements in pre-operative symptoms but is overall very pleased.  Sutures were removed and wound care instructions as well as scar massage were discussed. She is going to wait 3-4 weeks and then call our office to schedule CTR on the right.     Diagnoses and all orders for this visit:    1. Bilateral carpal tunnel syndrome (Primary)                   Dianne Dickinson PA-C  Patient Care Team:  Susie Reeder MD as PCP - General (Internal Medicine)  Stella Nice APRN as Referring Physician (Family Medicine)

## 2021-06-17 ENCOUNTER — OFFICE VISIT (OUTPATIENT)
Dept: PAIN MEDICINE | Facility: CLINIC | Age: 61
End: 2021-06-17

## 2021-06-17 VITALS
TEMPERATURE: 97.1 F | SYSTOLIC BLOOD PRESSURE: 132 MMHG | HEIGHT: 66 IN | BODY MASS INDEX: 37.86 KG/M2 | WEIGHT: 235.6 LBS | OXYGEN SATURATION: 98 % | DIASTOLIC BLOOD PRESSURE: 80 MMHG | HEART RATE: 97 BPM

## 2021-06-17 DIAGNOSIS — M50.10 HERNIATION OF CERVICAL INTERVERTEBRAL DISC WITH RADICULOPATHY: ICD-10-CM

## 2021-06-17 DIAGNOSIS — E10.9 INSULIN DEPENDENT DIABETES MELLITUS TYPE IA (HCC): ICD-10-CM

## 2021-06-17 DIAGNOSIS — E66.8 MODERATE OBESITY: ICD-10-CM

## 2021-06-17 DIAGNOSIS — M51.37 DEGENERATION OF LUMBAR OR LUMBOSACRAL INTERVERTEBRAL DISC: ICD-10-CM

## 2021-06-17 DIAGNOSIS — M47.816 SPONDYLOSIS OF LUMBAR REGION WITHOUT MYELOPATHY OR RADICULOPATHY: ICD-10-CM

## 2021-06-17 DIAGNOSIS — M47.812 CERVICAL SPONDYLOSIS WITHOUT MYELOPATHY: ICD-10-CM

## 2021-06-17 DIAGNOSIS — R53.81 PHYSICAL DECONDITIONING: ICD-10-CM

## 2021-06-17 DIAGNOSIS — G47.33 OSA ON CPAP: ICD-10-CM

## 2021-06-17 DIAGNOSIS — M50.20 CERVICAL DISC DISPLACEMENT: ICD-10-CM

## 2021-06-17 DIAGNOSIS — Z98.1 HISTORY OF FUSION OF CERVICAL SPINE: ICD-10-CM

## 2021-06-17 DIAGNOSIS — M48.062 LUMBAR STENOSIS WITH NEUROGENIC CLAUDICATION: Primary | ICD-10-CM

## 2021-06-17 DIAGNOSIS — M48.02 CERVICAL SPINAL STENOSIS: ICD-10-CM

## 2021-06-17 DIAGNOSIS — F41.9 ANXIETY AND DEPRESSION: ICD-10-CM

## 2021-06-17 DIAGNOSIS — Z99.89 OSA ON CPAP: ICD-10-CM

## 2021-06-17 DIAGNOSIS — R26.9 GAIT DISTURBANCE: ICD-10-CM

## 2021-06-17 DIAGNOSIS — F32.A ANXIETY AND DEPRESSION: ICD-10-CM

## 2021-06-17 DIAGNOSIS — M48.062 LUMBAR STENOSIS WITH NEUROGENIC CLAUDICATION: ICD-10-CM

## 2021-06-17 DIAGNOSIS — G56.03 BILATERAL CARPAL TUNNEL SYNDROME: ICD-10-CM

## 2021-06-17 PROCEDURE — 99204 OFFICE O/P NEW MOD 45 MIN: CPT | Performed by: ANESTHESIOLOGY

## 2021-06-29 ENCOUNTER — TELEPHONE (OUTPATIENT)
Dept: NEUROSURGERY | Facility: CLINIC | Age: 61
End: 2021-06-29

## 2021-06-29 ENCOUNTER — PREP FOR SURGERY (OUTPATIENT)
Dept: OTHER | Facility: HOSPITAL | Age: 61
End: 2021-06-29

## 2021-06-29 VITALS — BODY MASS INDEX: 37.86 KG/M2 | HEIGHT: 66 IN | WEIGHT: 235.6 LBS

## 2021-06-29 DIAGNOSIS — G56.03 BILATERAL CARPAL TUNNEL SYNDROME: Primary | ICD-10-CM

## 2021-06-29 DIAGNOSIS — G56.21 ULNAR NEUROPATHY AT ELBOW OF RIGHT UPPER EXTREMITY: ICD-10-CM

## 2021-06-29 RX ORDER — CEFAZOLIN SODIUM 2 G/100ML
2 INJECTION, SOLUTION INTRAVENOUS ONCE
Status: CANCELLED | OUTPATIENT
Start: 2021-06-29 | End: 2021-06-29

## 2021-06-29 NOTE — TELEPHONE ENCOUNTER
Provider:  Christopher  Caller: Patient  Time of call:   2:51  Phone #:  [phone]  Surgery:  CTR-Left  Surgery Date:  06/07/21  Last visit:   06/15/21  Next visit:     JAZMÍN:         Reason for call:    Patient would like to schedule CTR of the right hand and elbow.

## 2021-06-29 NOTE — TELEPHONE ENCOUNTER
Caller: Medina Rider    Relationship to patient: Self    Best call back number: [phone]    Chief complaint: RIGHT HAND AND ELBOW    Type of visit: CARPAL TUNNEL SURGERY  Requested date: ASAP    If rescheduling, when is the original appointment: N/A    Additional notes: PT CALLED TO SCHEDULE HER RIGHT HAND FOR CARPAL TUNNEL SURGERY.     PLEASE CALL PT AND ADVISE     THANK YOU

## 2021-06-29 NOTE — H&P
Patient: Medina Rider  : 1960  GENDER: female    Primary Care Provider: Susie Reeder MD    Requesting Provider: As above      History    Chief Complaint: Right carpal tunnel syndrome and ulnar neuropathy    History of Present Illness: Ms. Rider is a 61-year-old female who presented to our service with bilateral upper extremity pain and sensory alteration along the ulnar nerve distributions.  Electrodiagnostic studies confirmed bilateral carpal tunnel syndrome of moderate severity as well as right ulnar neuropathy.  As such, patient presented for staged carpal tunnel releases beginning with her left on 2021.    Presently, Ms. Rider is 3 weeks postop.  She is overall pleased with her current postoperative progress.  Sutures were removed on POD #8.  She is now ready to proceed with her right carpal tunnel and ulnar nerve release.  She has no other complaints at this time.    Review of Systems    Past Medical History:   Diagnosis Date   • Anemia    • Anesthesia complication     elevated BP after surgeries   • Anxiety and depression    • Arthritis    • Bipolar 1 disorder (CMS/HCC)    • Diabetes mellitus (CMS/HCC)    • Disease of thyroid gland     nodule on L lobe, PCP monitoring   • GERD (gastroesophageal reflux disease)    • Heart murmur    • History of kidney stones    • History of positive PPD     clear chest x-rays (unable to take meds r/t liver intolerance)   • Hyperlipidemia    • Hypertension    • Panic attacks    • Pneumonia     last incident 2020   • PONV (postoperative nausea and vomiting)    • Rheumatic fever     as a child    • Sleep apnea     wears CPAP sometimes    • UTI (urinary tract infection)     last one 6 months ago     Past Surgical History:   Procedure Laterality Date   • ANTERIOR CERVICAL DISCECTOMY W/ FUSION N/A 3/9/2020    Procedure: CERVICAL DISCECTOMY ANTERIOR WITH FUSION C5-7;  Surgeon: Jairo White MD;  [facility];  Service: Neurosurgery;   Laterality: N/A;   • APPENDECTOMY     • CARPAL TUNNEL RELEASE Left 2021    Procedure: CARPAL TUNNEL RELEASE LEFT;  Surgeon: Jairo White MD;  Location: [facility];  Service: Neurosurgery;  Laterality: Left;   •  SECTION      x4   • CHOLECYSTECTOMY     • HEMORRHOIDECTOMY     • HYSTERECTOMY     • TONSILLECTOMY         Current Outpatient Medications:   •  acetaminophen (TYLENOL) 500 MG tablet, Take 500 mg by mouth Every 6 (Six) Hours As Needed for Mild Pain ., Disp: , Rfl:   •  aspirin 81 MG chewable tablet, Chew 81 mg daily., Disp: , Rfl:   •  cyclobenzaprine (FLEXERIL) 10 MG tablet, TAKE 1 TABLET BY MOUTH THREE TIMES A DAY AS NEEDED FOR MUSCLE SPASMS (Patient taking differently: Take 10 mg by mouth 3 (Three) Times a Day As Needed.), Disp: 60 tablet, Rfl: 0  •  estradiol (ESTRACE) 2 MG tablet, Take 2 mg by mouth Daily., Disp: , Rfl:   •  ezetimibe (ZETIA) 10 MG tablet, Take 10 mg by mouth Daily., Disp: , Rfl:   •  famotidine (PEPCID) 10 MG tablet, Take 10 mg by mouth 2 (two) times a day., Disp: , Rfl:   •  gabapentin (NEURONTIN) 800 MG tablet, Take 800 mg by mouth 3 (three) times a day., Disp: , Rfl:   •  HYDROcodone-acetaminophen (NORCO)  MG per tablet, Take 1 tablet by mouth 3 (Three) Times a Day As Needed for Moderate Pain ., Disp: 20 tablet, Rfl: 0  •  HYDROcodone-acetaminophen (NORCO) 7.5-325 MG per tablet, Take 1 tablet by mouth Every 8 (Eight) Hours As Needed for Moderate Pain ., Disp: 15 tablet, Rfl: 0  •  insulin glargine (LANTUS) 100 UNIT/ML injection, Inject 80 Units under the skin into the appropriate area as directed Every Night., Disp: , Rfl:   •  Insulin Lispro (HUMALOG SC), Inject  under the skin into the appropriate area as directed 3 (Three) Times a Day. Per sliding scale, Disp: , Rfl:   •  linaclotide (LINZESS) 145 MCG capsule capsule, Take 145 mcg by mouth Every Morning Before Breakfast., Disp: , Rfl:   •  lisinopril (PRINIVIL,ZESTRIL) 40 MG tablet, Take 40 mg by mouth Daily.,  "Disp: , Rfl:   •  melatonin 5 MG tablet tablet, Take 10 mg by mouth At Night As Needed., Disp: , Rfl:   •  metFORMIN (GLUCOPHAGE) 1000 MG tablet, Take 1,000 mg by mouth 2 (two) times a day with meals., Disp: , Rfl:   •  naproxen (NAPROSYN) 375 MG tablet, Take 375 mg by mouth 2 (Two) Times a Day As Needed for Mild Pain ., Disp: , Rfl:   •  nitrofurantoin (MACRODANTIN) 50 MG capsule, Take 50 mg by mouth every night., Disp: , Rfl:   •  ondansetron (ZOFRAN) 4 MG tablet, Take 4 mg by mouth Every 8 (Eight) Hours As Needed for Nausea or Vomiting., Disp: , Rfl:   •  promethazine (PHENERGAN) 25 MG tablet, Take 25 mg by mouth Every 6 (Six) Hours As Needed for Nausea or Vomiting., Disp: , Rfl:   •  traMADol (ULTRAM) 50 MG tablet, Take 1 tablet by mouth Every 6 (Six) Hours As Needed for Moderate Pain ., Disp: 50 tablet, Rfl: 0  •  venlafaxine XR (EFFEXOR-XR) 150 MG 24 hr capsule, Take 150 mg by mouth 2 (two) times a day., Disp: , Rfl:     Allergies   Allergen Reactions   • Statins Myalgia   • Augmentin [Amoxicillin-Pot Clavulanate] Nausea And Vomiting   • Sulfa Antibiotics GI Intolerance       The patient's review of systems, past medical history, past surgical history, family history, and social history have been reviewed at length in the electronic medical record.    Physical Exam:   Ht 167.6 cm (65.98\")   Wt 107 kg (235 lb 9.6 oz)   BMI 38.05 kg/m²   Consitutional: A&Ox3, pleasant, no acute distress  Skin:   - Well healed surgical incision   - No evidence of wound dehiscence  - NSOI   - Non-TTP    B/L UE CTS/CuTS Assessment:  - (+) Tinels   - (+) Phalen's Sign   - (+) slight APB atrophy appreciated (R>L)  - Good strength with resisted finger abduction   - (-) ulnar clawing   - (-) Froment's Sign   - Sensation intact to light touch  - Vascular intact     Patient's Body mass index is 38.05 kg/m². indicating that she is morbidly obese (BMI > 40 or > 35 with obesity - related health condition). Obesity-related health " conditions include the following: obstructive sleep apnea, coronary heart disease, diabetes mellitus, dyslipidemias and GERD. Obesity is unchanged. BMI is is above average; BMI management plan is completed. We discussed portion control and increasing exercise..         Medical Decision Making    Data Review:   1. EMG/NCS (3/17/2021):  Independent review of electrodiagnostic studies revealed bilateral carpal tunnel syndrome of moderate severity as well as a right ulnar neuropathy.  She additionally was noted to have chronic right C5/6 and C6/7 radiculopathies.    Diagnosis/Treatment Options:  1. Bilateral carpal tunnel syndrome  2. Ulnar neuropathy at elbow of right upper extremity    - Case Request; Standing  - ceFAZolin in dextrose (ANCEF) IVPB solution 2 g  - CBC and Differential; Future  - Basic metabolic panel; Future  - MRSA Screen Culture (Outpatient) - Swab, Nares; Future  - Case Request       Follow up:  Ms. Rider was seen in follow-up after undergoing an uncomplicated left carpal tunnel release on 6/7/2021.  She is approximately 3 weeks postop and is ready to schedule her right carpal tunnel and ulnar nerve release.  I went over the procedure in detail including the associated risks, complications, limitations and alternatives to surgery.  Patient verbalized understanding and has agreed to proceed.    Rehana Iniguez PA-C   6/29/2021   16:21 EDT

## 2021-06-30 PROBLEM — G56.21 ULNAR NEUROPATHY AT ELBOW OF RIGHT UPPER EXTREMITY: Status: ACTIVE | Noted: 2021-06-30

## 2021-07-01 DIAGNOSIS — M50.00 HERNIATED NUCLEUS PULPOSUS WITH MYELOPATHY, CERVICAL: ICD-10-CM

## 2021-07-01 DIAGNOSIS — Z98.1 S/P CERVICAL SPINAL FUSION: ICD-10-CM

## 2021-07-01 DIAGNOSIS — R20.0 BILATERAL HAND NUMBNESS: ICD-10-CM

## 2021-07-01 DIAGNOSIS — G56.03 BILATERAL CARPAL TUNNEL SYNDROME: Primary | ICD-10-CM

## 2021-07-01 DIAGNOSIS — M54.12 CERVICAL RADICULOPATHY: ICD-10-CM

## 2021-07-01 DIAGNOSIS — M48.062 SPINAL STENOSIS, LUMBAR REGION, WITH NEUROGENIC CLAUDICATION: ICD-10-CM

## 2021-07-01 RX ORDER — CHLORHEXIDINE GLUCONATE 4 G/100ML
SOLUTION TOPICAL
Qty: 120 ML | Refills: 0 | Status: SHIPPED | OUTPATIENT
Start: 2021-07-01

## 2021-07-01 NOTE — TELEPHONE ENCOUNTER
Provider:  Christopher  Caller:   Time of call:     Phone #:    Surgery:  CTR*R  Surgery Date:    Last visit:   07/29/21  Next visit:     JAZMÍN:         Reason for call:     Please sign orders for Mupirocin ointment and Chlorhexidine Gluconate.

## 2021-07-07 ENCOUNTER — OUTSIDE FACILITY SERVICE (OUTPATIENT)
Dept: PAIN MEDICINE | Facility: CLINIC | Age: 61
End: 2021-07-07

## 2021-07-07 PROCEDURE — 99152 MOD SED SAME PHYS/QHP 5/>YRS: CPT | Performed by: ANESTHESIOLOGY

## 2021-07-07 PROCEDURE — 64483 NJX AA&/STRD TFRM EPI L/S 1: CPT | Performed by: ANESTHESIOLOGY

## 2021-07-08 ENCOUNTER — TELEPHONE (OUTPATIENT)
Dept: PAIN MEDICINE | Facility: CLINIC | Age: 61
End: 2021-07-08

## 2021-07-08 NOTE — TELEPHONE ENCOUNTER
Spoke with pt regarding yesterday’s procedure with Dr. Chavarria. Pt stated that their blood sugars were running a little high, and she had a bit of a headache but she's okay. Advised of f/u appointment. Reminder card in the mail.

## 2021-07-27 ENCOUNTER — PRE-ADMISSION TESTING (OUTPATIENT)
Dept: PREADMISSION TESTING | Facility: HOSPITAL | Age: 61
End: 2021-07-27

## 2021-07-27 VITALS — HEIGHT: 66 IN | BODY MASS INDEX: 37.99 KG/M2 | WEIGHT: 236.4 LBS

## 2021-07-27 DIAGNOSIS — G56.21 ULNAR NEUROPATHY AT ELBOW OF RIGHT UPPER EXTREMITY: ICD-10-CM

## 2021-07-27 DIAGNOSIS — G56.03 BILATERAL CARPAL TUNNEL SYNDROME: ICD-10-CM

## 2021-07-27 LAB
ANION GAP SERPL CALCULATED.3IONS-SCNC: 10 MMOL/L (ref 5–15)
BASOPHILS # BLD AUTO: 0.06 10*3/MM3 (ref 0–0.2)
BASOPHILS NFR BLD AUTO: 0.8 % (ref 0–1.5)
BUN SERPL-MCNC: 10 MG/DL (ref 6–20)
BUN/CREAT SERPL: 14.3 (ref 7–25)
CALCIUM SPEC-SCNC: 9.7 MG/DL (ref 8.6–10.5)
CHLORIDE SERPL-SCNC: 102 MMOL/L (ref 98–107)
CO2 SERPL-SCNC: 26 MMOL/L (ref 22–29)
CREAT SERPL-MCNC: 0.7 MG/DL (ref 0.57–1)
DEPRECATED RDW RBC AUTO: 45.1 FL (ref 37–54)
EOSINOPHIL # BLD AUTO: 0.26 10*3/MM3 (ref 0–0.4)
EOSINOPHIL NFR BLD AUTO: 3.7 % (ref 0.3–6.2)
ERYTHROCYTE [DISTWIDTH] IN BLOOD BY AUTOMATED COUNT: 13.1 % (ref 12.3–15.4)
GFR SERPL CREATININE-BSD FRML MDRD: 86 ML/MIN/1.73
GLUCOSE SERPL-MCNC: 195 MG/DL (ref 65–99)
HCT VFR BLD AUTO: 40.2 % (ref 34–46.6)
HGB BLD-MCNC: 12.8 G/DL (ref 12–15.9)
IMM GRANULOCYTES # BLD AUTO: 0.01 10*3/MM3 (ref 0–0.05)
IMM GRANULOCYTES NFR BLD AUTO: 0.1 % (ref 0–0.5)
LYMPHOCYTES # BLD AUTO: 2.74 10*3/MM3 (ref 0.7–3.1)
LYMPHOCYTES NFR BLD AUTO: 38.6 % (ref 19.6–45.3)
MCH RBC QN AUTO: 29.7 PG (ref 26.6–33)
MCHC RBC AUTO-ENTMCNC: 31.8 G/DL (ref 31.5–35.7)
MCV RBC AUTO: 93.3 FL (ref 79–97)
MONOCYTES # BLD AUTO: 0.51 10*3/MM3 (ref 0.1–0.9)
MONOCYTES NFR BLD AUTO: 7.2 % (ref 5–12)
MRSA DNA SPEC QL NAA+PROBE: NEGATIVE
NEUTROPHILS NFR BLD AUTO: 3.51 10*3/MM3 (ref 1.7–7)
NEUTROPHILS NFR BLD AUTO: 49.6 % (ref 42.7–76)
NRBC BLD AUTO-RTO: 0 /100 WBC (ref 0–0.2)
PLATELET # BLD AUTO: 266 10*3/MM3 (ref 140–450)
PMV BLD AUTO: 11.4 FL (ref 6–12)
POTASSIUM SERPL-SCNC: 5.1 MMOL/L (ref 3.5–5.2)
RBC # BLD AUTO: 4.31 10*6/MM3 (ref 3.77–5.28)
SARS-COV-2 RNA PNL SPEC NAA+PROBE: DETECTED
SODIUM SERPL-SCNC: 138 MMOL/L (ref 136–145)
WBC # BLD AUTO: 7.09 10*3/MM3 (ref 3.4–10.8)

## 2021-07-27 PROCEDURE — U0004 COV-19 TEST NON-CDC HGH THRU: HCPCS

## 2021-07-27 PROCEDURE — 87641 MR-STAPH DNA AMP PROBE: CPT

## 2021-07-27 PROCEDURE — 36415 COLL VENOUS BLD VENIPUNCTURE: CPT

## 2021-07-27 PROCEDURE — C9803 HOPD COVID-19 SPEC COLLECT: HCPCS

## 2021-07-27 PROCEDURE — 85025 COMPLETE CBC W/AUTO DIFF WBC: CPT

## 2021-07-27 PROCEDURE — 80048 BASIC METABOLIC PNL TOTAL CA: CPT

## 2021-07-27 RX ORDER — FLUTICASONE PROPIONATE 50 MCG
2 SPRAY, SUSPENSION (ML) NASAL DAILY PRN
COMMUNITY
Start: 2021-04-30

## 2021-07-27 RX ORDER — MONTELUKAST SODIUM 10 MG/1
10 TABLET ORAL NIGHTLY
COMMUNITY

## 2021-07-27 RX ORDER — CARIPRAZINE 1.5 MG/1
1.5 CAPSULE, GELATIN COATED ORAL DAILY
COMMUNITY
Start: 2021-05-27

## 2021-07-27 NOTE — PAT
Patient to apply Chlorhexadine wipes  to surgical area (as instructed) the night before procedure and the AM of procedure. Wipes provided.    An arrival time for procedure was not given during PAT visit. If patient had any questions or concerns about their arrival time, they were instructed to contact their surgeon/physician.  Additionally, if the patient referred to an arrival time that was acquired from their my chart account, patient was encouraged to verify that time with their surgeon/physician.  NO arrival times given in Pre Admission Testing Department.    COVID in PAT    EKG 6/4/21

## 2021-08-21 NOTE — TELEPHONE ENCOUNTER
E-Prescribing Status: Receipt confirmed by pharmacy (12/23/2020  1:30 PM EST)  
Requested Prescriptions     Pending Prescriptions Disp Refills   • cyclobenzaprine (FLEXERIL) 10 MG tablet [Pharmacy Med Name: CYCLOBENZAPRINE 10 MG TABLET] 60 tablet 0     Sig: TAKE 1 TABLET BY MOUTH THREE TIMES A DAY AS NEEDED FOR MUSCLE SPASMS       
pt rec'd supine in bed, +PIV

## 2021-08-23 NOTE — PROGRESS NOTES
"Chief Complaint: \"The injection did not help with my pain.\"        History of Present Illness:   Patient: Ms. Medina Rider, 61 y.o. female originally referred by Dianne Dickinson PA-C in consultation for chronic intractable lower back and neck pain.  She was last seen on July 7, 2021 when she underwent diagnostic and therapeutic bilateral L4-L5 transforaminal epidural steroid injections, from which she reports experiencing no significant pain relief or reduction in her pain levels.  She did not begin physical therapy.  She did not undergo referral to [facility] diabetes center as recommended.  She returns today for post procedure follow-up and evaluation.      Problem #1: Chronic Neck Pain:  Pain History: Patient reports a several year history of posterior neck pain, which began without incident.  Patient has a history of C5-C7 ACDF on 3/9/2020 by Dr. Jairo White.  Patient presented with cervical spondylosis with cord compromise.  She reports that after surgery she continued to experience neck pain with radiation to her left shoulder, and sometimes into the occipital region. EMG/NCV of the upper extremities revealed moderate to severe bilateral median neuropathy at the wrist, right ulnar neuropathy at the elbow, chronic right C5-C6 and C6-C7 radiculopathies.  Underlying sensorimotor polyneuropathy.  MRI of the cervical spine on 5/14/2021 revealed multilevel spondylosis.  S/p C5 C7 ACDF.  At C5-C6 left paracentral disc osteophyte complex indenting the left hemicord with moderate left paracentral spinal canal stenosis and left greater than right foraminal stenosis.  C6-C7: Disc osteophyte complex with mild to moderate right and mild left foraminal stenosis.  Medina Rider underwent follow-up neurosurgical consultation with Dianne Dickinson PA-C on 05/14/2021, and was found to be a potential surgical candidate for staged carpal tunnel release.  She was found not to be a " surgical candidate for her cervical or lumbar issues.   Pain Description: Constant pain with intermittent exacerbation, described as dull, aching, burning and throbbing sensation.   Radiation of Pain: The pain radiates from the posterior cervical region into the bilateral occipital region into the left shoulder   Pain intensity today: 6/10  Average pain intensity last week: 6/10  Pain intensity ranges from: 5/10 to 9/10  Aggravating factors: Pain increases with extension, flexion, rotation of the cervical spine.   Alleviating factors: Pain decreases with analgesics, rest, heat  Associated Symptoms:   Patient denies pain, numbness, or weakness in the upper extremities, other than RCT syndrome.   Patient reports difficulties with her balance but denies recent falls.   Patient reports bilateral cervicogenic and occipital headaches almost daily lasting several hours.    Problem #2: Chronic Lower Back Pain:  Pain History: Patient reports a several year history of lower back pain, which began without incident. MRI of the lumbar spine on 3/18/2021 revealed multilevel spondylitic changes.  At L4-L5 there is a disc bulge combined with ligamentum flavum hypertrophy and facet hypertrophy contributing to moderate canal and bilateral neuroforaminal stenosis.   Pain Description: Constant pain with intermittent exacerbation, described as aching, burning and throbbing sensation.   Radiation of Pain: The pain primarily does not radiate, at times the pain will radiate into her hips.  Pain intensity today: 5/10  Average pain intensity last week: 5/10  Pain intensity ranges from: 3/10 to 9/10  Aggravating factors: Pain increases with bending, twisting, standing, walking. Patient describes neurogenic claudication.    Alleviating factors: Pain decreases with sitting down, lying down   Associated Symptoms:   Patient denies pain, numbness, or weakness in the lower extremities.   Patient denies any new bladder or bowel problems.     Review  of previous therapies and additional medical records:  Medina Rider has already failed the following measures, including:   Conservative Measures: Oral analgesics, opioids, topical analgesics, ice, heat, chiropractic therapy, massage, physical therapy   Interventional Measures: Lumbar injections in 2018 with Dr. Madrigal  07/07/2021: DxTx bilateral L4-L5 TESI  Surgical Measures: C5-C7 ACDF on 3/9/2020 by Dr. Jairo White. Left carpal tunnel release on 6/7/2021 by Dr. Jairo White.  No history of previous lumbar spine or hip surgery  Medina Rider underwent follow-up neurosurgical consultation with Dianne Dickinson PA-C on 05/14/2021, and was found to be a potential surgical candidate for staged carpal tunnel release.  She was found not to be a surgical candidate for her cervical or lumbar issues.    Medina Rider presents with significant comorbidities including anxiety and depression, bipolar 1 disorder, panic attacks, diabetes mellitus, GERD, history of kidney stones, hyperlipidemia, hypertension, sleep apnea wears a CPAP sometimes  In terms of current analgesics, Medina Rider takes: Acetaminophen, cyclobenzaprine, gabapentin, Naprosyn, Norco, tramadol. Patient also takes Effexor  I have reviewed Yo[facility] Report #[id number] consistent with medication reconciliation.  SOAPP: Not completed by the patient    Global Pain Scale 06-17 2021 08-24  2021         Pain 20 22         Feelings 11 10         Clinical outcomes 20 21         Activities 10 18         GPS Total: 61 71           Review of Diagnostic Studies:    EMG/NCV 12/18/2020. Moderate severe bilateral median neuropathy at the wrist. Right ulnar neuropathy at the elbow. Chronic right C5-C6 and C6-7 radiculopathies.  Sensorimotor polyneuropathy  MRI of the cervical spine without contrast 5/14/2021.  Images revealed s/p C5 C7 ACDF with hardware in place with artifact.  Vertebral body heights and alignment are preserved.   Cervicomedullary junction is normal.  Spinal cord caliber and signal are preserved.  Diffuse spondylosis.   C3-C4: Right lateralizing disc osteophyte complex, uncovertebral hypertrophy contributing to mild right paracentral thecal sac effacement and canal stenosis with mild right neuroforaminal stenosis  C4-C5: Circumferential disc bulge with left paracentral predominance, uncovertebral hypertrophy contributing to mild left paracentral spinal canal stenosis and mild bilateral foraminal stenosis  C5-C6: Disc osteophyte complex with left paracentral predominance indenting the left hemicord, uncovertebral hypertrophy contributing to moderate bilateral neuroforaminal stenosis left greater than right  C6-C7: Disc osteophyte complex, uncovertebral hypertrophy moderate right and mild left foraminal stenosis  C7-T1: No significant stenosis  MRI of the lumbar spine without contrast 3/18/2021.  Images revealed vertebral body heights and alignment are preserved.  The conus medullaris has an unremarkable appearance.    L1-L2, L2-L3, disc bulge.  No significant canal or foraminal stenosis  L3-L4: Disc bulge, ligamentum flavum hypertrophy, facet hypertrophy, mild canal stenosis, mild right and moderate left foraminal stenosis  L4-L5: Disc bulge, ligamentum flavum hypertrophy, facet hypertrophy, moderate left paracentral spinal canal stenosis and left neuroforaminal stenosis with moderate right foraminal stenosis  L5-S1: Disc bulge, facet hypertrophy.  Mild canal and mild foraminal stenosis        Review of Systems   Constitutional: Positive for activity change.   HENT: Positive for dental problem.    Respiratory: Positive for apnea.    Musculoskeletal: Positive for arthralgias, back pain, myalgias, neck pain and neck stiffness.   Neurological: Positive for dizziness, weakness, light-headedness, numbness and headaches.   All other systems reviewed and are negative.        Patient Active Problem List   Diagnosis   • Herniation of  cervical intervertebral disc with radiculopathy   • Bilateral carpal tunnel syndrome   • Lumbar stenosis with neurogenic claudication   • Degeneration of lumbar or lumbosacral intervertebral disc   • Spondylosis of lumbar region without myelopathy or radiculopathy   • History of fusion of cervical spine   • Cervical spinal stenosis   • Cervical spondylosis without myelopathy   • Insulin dependent diabetes mellitus type IA (CMS/HCC)   • Moderate obesity   • Anxiety and depression   • Gait disturbance   • Physical deconditioning   • CON on CPAP   • Ulnar neuropathy at elbow of right upper extremity       Past Medical History:   Diagnosis Date   • Anemia    • Anesthesia complication     elevated BP after surgeries   • Anxiety and depression    • Arthritis    • Bipolar 1 disorder (CMS/HCC)    • Diabetes mellitus (CMS/HCC)    • Disease of thyroid gland     nodule on L lobe, PCP monitoring   • GERD (gastroesophageal reflux disease)    • Heart murmur    • History of kidney stones    • History of positive PPD     clear chest x-rays (unable to take meds r/t liver intolerance)   • Hyperlipidemia    • Hypertension    • Panic attacks    • Pneumonia     last incident 2020   • PONV (postoperative nausea and vomiting)     in the past but not recently    • Rheumatic fever     as a child    • Sleep apnea     wears CPAP sometimes    • UTI (urinary tract infection)     last one 6 months ago         Past Surgical History:   Procedure Laterality Date   • ANTERIOR CERVICAL DISCECTOMY W/ FUSION N/A 3/9/2020    Procedure: CERVICAL DISCECTOMY ANTERIOR WITH FUSION C5-7;  Surgeon: Jairo White MD;  [facility];  Service: Neurosurgery;  Laterality: N/A;   • APPENDECTOMY     • CARPAL TUNNEL RELEASE Left 2021    Procedure: CARPAL TUNNEL RELEASE LEFT;  Surgeon: Jairo White MD;  [facility];  Service: Neurosurgery;  Laterality: Left;   •  SECTION      x4   • CHOLECYSTECTOMY     • HEMORRHOIDECTOMY     •  HYSTERECTOMY     • TONSILLECTOMY           Family History   Problem Relation Age of Onset   • Heart disease Mother    • Diabetes Father    • Heart disease Father    • Heart disease Sister    • Cancer Brother          Social History     Socioeconomic History   • Marital status:      Spouse name: Not on file   • Number of children: Not on file   • Years of education: Not on file   • Highest education level: Not on file   Tobacco Use   • Smoking status: Never Smoker   • Smokeless tobacco: Never Used   Vaping Use   • Vaping Use: Never used   Substance and Sexual Activity   • Alcohol use: No   • Drug use: No   • Sexual activity: Defer           Current Outpatient Medications:   •  acetaminophen (TYLENOL) 500 MG tablet, Take 500 mg by mouth Every 6 (Six) Hours As Needed for Mild Pain ., Disp: , Rfl:   •  aspirin 81 MG chewable tablet, Chew 81 mg daily., Disp: , Rfl:   •  chlorhexidine (HIBICLENS) 4 % external liquid, Shower each day with solution 5 days, beginning 5 days before surgery., Disp: 120 mL, Rfl: 0  •  cyclobenzaprine (FLEXERIL) 10 MG tablet, TAKE 1 TABLET BY MOUTH THREE TIMES A DAY AS NEEDED FOR MUSCLE SPASMS (Patient taking differently: Take 10 mg by mouth 3 (Three) Times a Day As Needed.), Disp: 60 tablet, Rfl: 0  •  estradiol (ESTRACE) 2 MG tablet, Take 2 mg by mouth Daily., Disp: , Rfl:   •  ezetimibe (ZETIA) 10 MG tablet, Take 10 mg by mouth Daily., Disp: , Rfl:   •  famotidine (PEPCID) 10 MG tablet, Take 10 mg by mouth 2 (two) times a day., Disp: , Rfl:   •  fluticasone (FLONASE) 50 MCG/ACT nasal spray, 2 sprays into the nostril(s) as directed by provider Daily As Needed., Disp: , Rfl:   •  gabapentin (NEURONTIN) 800 MG tablet, Take 800 mg by mouth 3 (three) times a day., Disp: , Rfl:   •  hydroCHLOROthiazide (HYDRODIURIL) 25 MG tablet, Take 25 mg by mouth Every Morning., Disp: , Rfl:   •  HYDROcodone-acetaminophen (NORCO) 7.5-325 MG per tablet, Take 1 tablet by mouth Every 8 (Eight) Hours As  Needed for Moderate Pain ., Disp: 15 tablet, Rfl: 0  •  hydrOXYzine (ATARAX) 25 MG tablet, , Disp: , Rfl:   •  insulin glargine (LANTUS) 100 UNIT/ML injection, Inject 80 Units under the skin into the appropriate area as directed Every Night., Disp: , Rfl:   •  Insulin Lispro (HUMALOG SC), Inject  under the skin into the appropriate area as directed 3 (Three) Times a Day. Per sliding scale, Disp: , Rfl:   •  linaclotide (LINZESS) 145 MCG capsule capsule, Take 145 mcg by mouth As Needed., Disp: , Rfl:   •  lisinopril (PRINIVIL,ZESTRIL) 40 MG tablet, Take 40 mg by mouth Daily., Disp: , Rfl:   •  melatonin 5 MG tablet tablet, Take 10 mg by mouth At Night As Needed., Disp: , Rfl:   •  metFORMIN (GLUCOPHAGE) 1000 MG tablet, Take 1,000 mg by mouth 2 (two) times a day with meals., Disp: , Rfl:   •  montelukast (SINGULAIR) 10 MG tablet, Take 10 mg by mouth Every Night., Disp: , Rfl:   •  mupirocin (Bactroban) 2 % ointment, Apply to the inside of each nostril with a cotton swab, bid, morning and evening 5 days before surgery., Disp: 22 g, Rfl: 0  •  naproxen (NAPROSYN) 375 MG tablet, Take 375 mg by mouth 2 (Two) Times a Day As Needed for Mild Pain ., Disp: , Rfl:   •  nitrofurantoin (MACRODANTIN) 50 MG capsule, Take 50 mg by mouth every night., Disp: , Rfl:   •  ondansetron (ZOFRAN) 4 MG tablet, Take 4 mg by mouth Every 8 (Eight) Hours As Needed for Nausea or Vomiting., Disp: , Rfl:   •  promethazine (PHENERGAN) 25 MG tablet, Take 25 mg by mouth Every 6 (Six) Hours As Needed for Nausea or Vomiting., Disp: , Rfl:   •  traMADol (ULTRAM) 50 MG tablet, Take 1 tablet by mouth Every 6 (Six) Hours As Needed for Moderate Pain ., Disp: 50 tablet, Rfl: 0  •  venlafaxine XR (EFFEXOR-XR) 150 MG 24 hr capsule, Take 150 mg by mouth 2 (two) times a day., Disp: , Rfl:   •  Vraylar 1.5 MG capsule capsule, Take 1.5 mg by mouth Daily. Pt has not started yet, Disp: , Rfl:       Allergies   Allergen Reactions   • Statins Myalgia   • Augmentin  "[Amoxicillin-Pot Clavulanate] Nausea And Vomiting   • Seroquel [Quetiapine] Myalgia   • Sulfa Antibiotics GI Intolerance         /70   Pulse 80   Temp 98.1 °F (36.7 °C)   Resp 16   Ht 167.6 cm (66\")   Wt 107 kg (236 lb 9.6 oz)   SpO2 98%   BMI 38.19 kg/m²       Physical Exam:  Constitutional: Patient appears well-developed, well-nourished, well-hydrated  HEENT: Head: Normocephalic and atraumatic  Eyes: Conjunctivae and lids are normal  Pupils: Equal, round, reactive to light  Neck: Trachea normal. Neck supple. No JVD present.   Lymphatic: No cervical adenopathy  Pulmonary: No increased work of breathing or signs of respiratory distress. Auscultation of lungs: Clear to auscultation.   Musculoskeletal   Gait and station: Gait evaluation demonstrated a normal gait. Able to walk on heels, toes, tandem walking   Cervical spine: Passive and active range of motion are limited secondary to pain. Extension, flexion, lateral flexion, rotation of the cervical spine increased and reproduced pain. Cervical facet joint loading maneuvers are positive.  Muscles: Presence of active trigger points at the levator scapulae   Shoulders: The range of motion of the glenohumeral joints is full and without pain. Rotator cuff strength is 5/5.   Lumbar spine: Passive and active range of motion are limited secondary to pain. Extension, flexion, lateral flexion, rotation of the lumbar spine increased and reproduced pain. Lumbar facet joint loading maneuvers are positive.  Andrea test and Gaenslen's test are negative   Piriformis maneuvers are negative   Palpation of the bilateral greater trochanter, unrevealing   Examination of the Iliotibial band: unrevealing   Hip joints: The range of motion of the hip joints are limited secondary to back pain  Neurological:   Patient is alert and oriented to person, place, and time.   Speech: Normal.   Cortical function: Normal mental status.   Cranial nerves 2-12: intact.   Reflex " Scores:  Right brachioradialis: 2+  Left brachioradialis: 2+  Right biceps: 2+  Left biceps: 2+  Right triceps: 2+  Left triceps: 2+  Right patellar: 1+  Left patellar: 1+  Right Achilles: 1+  Left Achilles: 1+  Motor strength: 5/5  Motor Tone: Normal .   Involuntary movements: None.   Superficial/Primitive Reflexes: Primitive reflexes were absent.   Right Hurtado: Absent  Left Hurtado: Absent  Right ankle clonus: Absent  Left ankle clonus: Absent   Babinsky: Absent  Spurling sign: Negative. Neck tornado test: Negative. Lhermitte sign: Negative. Long tract signs: Negative. Straight leg raising test: Elicits her lower back pain bilaterally.   Sensory exam: Intact to light touch, intact pain and temperature sensation, intact vibration sensation and normal proprioception.   Coordination: Normal finger to nose and heel to shin. Normal balance and negative Romberg's sign   Skin and subcutaneous tissue: Skin is warm and intact. No rash noted. No cyanosis.   Psychiatric: Judgment and insight: Normal. Orientation to person, place and time: Normal. Recent and remote memory: Intact. Mood and affect: Normal.     ASSESSMENT:   1. Lumbar stenosis with neurogenic claudication    2. Degeneration of lumbar or lumbosacral intervertebral disc    3. Spondylosis of lumbar region without myelopathy or radiculopathy    4. Cervical spinal stenosis    5. History of fusion of cervical spine    6. Bilateral carpal tunnel syndrome    7. Moderate obesity    8. CON on CPAP    9. Anxiety and depression    10. Gait disturbance    11. Physical deconditioning          PLAN/MEDICAL DECISION MAKING:  Patient reports a several year history of lower back pain and neck pain. Patient has failed to obtain pain relief with conservative measures including oral analgesics and physical therapy.  MRI of the lumbar spine on 3/18/2021 revealed multilevel spondylitic changes.  At L4-L5 there is a disc bulge combined with ligamentum flavum hypertrophy and facet  "hypertrophy contributing to moderate canal and bilateral neuroforaminal stenosis.  Patient also has a history of progressive posterior cervicalgia. She underwent C5-C7 ACDF on 3/9/2020 by Dr. Jairo White, and reports that after her surgery she continued experiencing neck pain with radiation to left shoulder, and at times into the occipital region. EMG/NCV of the upper extremities revealed moderate to severe bilateral median neuropathy at the wrist, right ulnar neuropathy at the elbow, chronic right C5-C6 and C6-C7 radiculopathies.  Underlying sensorimotor polyneuropathy.  MRI of the cervical spine on 5/14/2021 revealed multilevel spondylosis.  S/p C5 C7 ACDF.  At C5-C6 left paracentral disc osteophyte complex indenting the left hemicord with moderate left paracentral spinal canal stenosis and left greater than right foraminal stenosis.  C6-C7: Disc osteophyte complex with mild to moderate right and mild left foraminal stenosis.  Medina Rider underwent follow-up neurosurgical consultation with Dianne Dickinson PA-C on 05/14/2021, and was found to be a potential surgical candidate for staged carpal tunnel release.  She was found not to be a surgical candidate for her cervical or lumbar issues.  She underwent left carpal tunnel release on 6/7/2021 with significant improvement.  Unfortunately, she has failed to find relief with transforaminal epidural as referenced under HPI.  She did not participate in physical therapy, despite recommendations, and tells me physical therapy did not work for her and she is not interested retrialing this modality.  Additionally, she did not undergo recommended consult with weight loss.  At this time, she expresses she is not interested in any further injections, \"as they do not work\".  I informed her unfortunately, we are not a medication management clinic and do not manage medications or opioid therapies.  I have discussed with her further recommendations and further " procedures to help alleviate some of her symptoms, from which she has declined at this time.  I would be happy to evaluate her in the future if she is interested in further interventional pain management measures as discussed.  Patient has failed to obtain pain relief with conservative measures and previous interventional pain management measures,  as referenced above. I have reviewed all available patient's medical records as well as previous therapies as referenced above. I had a lengthy conversation with Ms. Medina Rider regarding her chronic pain condition and potential therapeutic options including risks, benefits, alternative therapies, to name a few. Therefore, I have proposed the following plan:  1. Diagnostic studies: Patient will be a candidate for cervical and lumbar myelogram followed by CT postmyelogram if she continues to struggle with pain  2. Pharmacological measures: Reviewed and discussed; Patient takes acetaminophen, cyclobenzaprine, gabapentin, Naprosyn, Norco, tramadol. Patient also takes Effexor. Patient has declined additional pharmacological measures   3. Interventional pain management measures:  A.  Treatment of chronic lower back pain/spinal stenosis: None indicated at this time.  Patient states she is no longer interested in further procedures at this time she will contact us if she decides to pursue further interventional measures.  I did discuss with her the possibility of repeating bilateral L4-5 transforaminal epidural steroid injections versus diagnostic bilateral lumbar medial branch blocks at L2, L3, L4; for bilateral lumbar facet joints at L3-L4, L4-L5, to clarify the origin of chronic refractory mechanical lower back pain. If patient experiences more than 80% relief along with significant improvement the range of motion of the lumbar spine, then, patient will be scheduled for a second set of diagnostic bilateral lumbar medial branch blocks, to then, proceed with  bilateral lumbar medial branch rhizotomies  B.  Treatment of chronic neck pain: It has been discussed as well the possibility of cervical epidural steroid injection by left foraminal interlaminar approach.  We could repeat the procedure or move forward with selective epidural steroid injection.  Patient also has some elements of mechanical pain of the cervical spine.  4. Long-term rehabilitation efforts:  A. The patient does not have a history of falls. I did complete a risk assessment for falls  B. Patient has declined participation in a comprehensive physical therapy program for therapeutic exercise, upper body strengthening/posture correction, core strengthening, gait and balance training, neurodynamics, myofascial release, cupping and dry needling   C. Start an exercise program such as water therapy  D. Contrast therapy: Apply ice-packs for 15-20 minutes, followed by heating pads for 15-20 minutes to affected area   E. Patient has declined additional referral to [facility]. Patient counseled on the importance of weight loss to help with overall health and pain control. Patient instructed to attempt weight loss.    5. The patient has been instructed to contact my office with any questions or difficulties. The patient understands the plan and agrees to proceed accordingly.        Patient Care Team:  Susie Reeder MD as PCP - General (Internal Medicine)  Abell, STEVEN Gallardo as Referring Physician (Family Medicine)     No orders of the defined types were placed in this encounter.        No future appointments.      STEVEN Nagel     EMR Dragon/Transcription disclaimer:  Much of this encounter note is an electronic transcription of spoken language to printed text. Electronic transcription of spoken language may permit erroneous, or at times, nonsensical words or phrases to be inadvertently transcribed. Although I have reviewed the note for such errors, some may still  exist.

## 2021-08-24 ENCOUNTER — TELEPHONE (OUTPATIENT)
Dept: NEUROSURGERY | Facility: CLINIC | Age: 61
End: 2021-08-24

## 2021-08-24 ENCOUNTER — OFFICE VISIT (OUTPATIENT)
Dept: PAIN MEDICINE | Facility: CLINIC | Age: 61
End: 2021-08-24

## 2021-08-24 VITALS
SYSTOLIC BLOOD PRESSURE: 110 MMHG | OXYGEN SATURATION: 98 % | DIASTOLIC BLOOD PRESSURE: 70 MMHG | BODY MASS INDEX: 38.02 KG/M2 | RESPIRATION RATE: 16 BRPM | WEIGHT: 236.6 LBS | HEIGHT: 66 IN | HEART RATE: 80 BPM | TEMPERATURE: 98.1 F

## 2021-08-24 DIAGNOSIS — Z99.89 OSA ON CPAP: ICD-10-CM

## 2021-08-24 DIAGNOSIS — M48.062 LUMBAR STENOSIS WITH NEUROGENIC CLAUDICATION: ICD-10-CM

## 2021-08-24 DIAGNOSIS — R53.81 PHYSICAL DECONDITIONING: ICD-10-CM

## 2021-08-24 DIAGNOSIS — E66.8 MODERATE OBESITY: ICD-10-CM

## 2021-08-24 DIAGNOSIS — G47.33 OSA ON CPAP: ICD-10-CM

## 2021-08-24 DIAGNOSIS — M47.816 SPONDYLOSIS OF LUMBAR REGION WITHOUT MYELOPATHY OR RADICULOPATHY: ICD-10-CM

## 2021-08-24 DIAGNOSIS — M51.37 DEGENERATION OF LUMBAR OR LUMBOSACRAL INTERVERTEBRAL DISC: ICD-10-CM

## 2021-08-24 DIAGNOSIS — G56.03 BILATERAL CARPAL TUNNEL SYNDROME: ICD-10-CM

## 2021-08-24 DIAGNOSIS — Z98.1 HISTORY OF FUSION OF CERVICAL SPINE: ICD-10-CM

## 2021-08-24 DIAGNOSIS — M48.02 CERVICAL SPINAL STENOSIS: ICD-10-CM

## 2021-08-24 DIAGNOSIS — R26.9 GAIT DISTURBANCE: ICD-10-CM

## 2021-08-24 DIAGNOSIS — F32.A ANXIETY AND DEPRESSION: ICD-10-CM

## 2021-08-24 DIAGNOSIS — F41.9 ANXIETY AND DEPRESSION: ICD-10-CM

## 2021-08-24 PROCEDURE — 99213 OFFICE O/P EST LOW 20 MIN: CPT | Performed by: NURSE PRACTITIONER

## 2021-08-24 RX ORDER — HYDROXYZINE HYDROCHLORIDE 25 MG/1
TABLET, FILM COATED ORAL
COMMUNITY
Start: 2021-08-23

## 2021-08-24 RX ORDER — HYDROCHLOROTHIAZIDE 25 MG/1
25 TABLET ORAL EVERY MORNING
COMMUNITY
Start: 2021-07-26

## 2021-08-24 NOTE — TELEPHONE ENCOUNTER
Caller: EAV VEGA    Relationship to patient: SELF    Best call back number: [phone]    Chief complaint: RESCHEDULE     Type of visit: SURGERY    Requested date: N/A    If rescheduling, when is the original appointment: 07/29/21    Additional notes: PATIENT WAS SCHEDULED TO HAVE CARPAL TUNNEL SURGERY ON HER RT HAND WITH DR. RAJAN 07-29-21 BUT SURGERY WAS CANCELLED DUE TO TESTING POSITIVE FOR COVID 19.. PATIENT IS WANTING TO GO AHEAD AND RESCHEDULE THIS SURGERY. PLEASE ADVISE.

## 2022-12-08 NOTE — TELEPHONE ENCOUNTER
Called and spoke with pt.   Adam alatorre I am going to put in an order for R CTR and R Ulnar Nerve release    29

## 2023-07-26 ENCOUNTER — OFFICE VISIT (OUTPATIENT)
Dept: NEUROSURGERY | Facility: CLINIC | Age: 63
End: 2023-07-26
Payer: MEDICARE

## 2023-07-26 VITALS
WEIGHT: 227 LBS | SYSTOLIC BLOOD PRESSURE: 126 MMHG | BODY MASS INDEX: 36.48 KG/M2 | DIASTOLIC BLOOD PRESSURE: 76 MMHG | TEMPERATURE: 97.1 F | HEIGHT: 66 IN

## 2023-07-26 DIAGNOSIS — G56.01 RIGHT CARPAL TUNNEL SYNDROME: Primary | ICD-10-CM

## 2023-07-26 DIAGNOSIS — M54.12 CERVICAL RADICULOPATHY: ICD-10-CM

## 2023-07-26 DIAGNOSIS — G56.21 ULNAR NEUROPATHY AT ELBOW, RIGHT: ICD-10-CM

## 2023-07-26 PROCEDURE — 99024 POSTOP FOLLOW-UP VISIT: CPT | Performed by: PHYSICIAN ASSISTANT

## 2023-07-26 RX ORDER — ONDANSETRON 4 MG/1
TABLET, FILM COATED ORAL
COMMUNITY
Start: 2023-07-17

## 2023-07-26 RX ORDER — HYDROCHLOROTHIAZIDE 25 MG/1
TABLET ORAL
COMMUNITY
Start: 2023-07-18

## 2023-07-26 RX ORDER — ALPRAZOLAM 0.5 MG/1
1 TABLET ORAL EVERY 12 HOURS SCHEDULED
COMMUNITY
Start: 2023-07-11

## 2023-07-26 RX ORDER — HYDROXYZINE HYDROCHLORIDE 25 MG/1
TABLET, FILM COATED ORAL
COMMUNITY
Start: 2023-07-21

## 2023-07-26 RX ORDER — METHOCARBAMOL 500 MG/1
TABLET, FILM COATED ORAL
COMMUNITY
Start: 2023-06-04

## 2023-07-26 RX ORDER — MECLIZINE HYDROCHLORIDE 25 MG/1
TABLET ORAL
COMMUNITY
Start: 2023-07-24

## 2023-07-26 RX ORDER — GLUCOSAM/CHON-MSM1/C/MANG/BOSW 500-416.6
TABLET ORAL
COMMUNITY
Start: 2023-07-18

## 2023-07-26 RX ORDER — OXCARBAZEPINE 300 MG/1
TABLET, FILM COATED ORAL
COMMUNITY
Start: 2023-07-12

## 2023-07-26 NOTE — PROGRESS NOTES
"Patient: Medina Rider  : 1960  Chart #: [id number]    Date of Service: 2023    CHIEF COMPLAINT:   Right ulnar neuropathy at the elbow  2.   Right carpal tunnel syndrome    History of Present Illness Ms. Rider is a 63-year-old woman who presented with ongoing pain, weakness, and sensory alteration involving her hand and arm on the right side.  Studies demonstrated significant ulnar neuropathy at the elbow as well as carpal tunnel syndrome.  As such on 2023 she presented for ulnar nerve decompression at the elbow and carpal tunnel release. Today she is two weeks post-op and presents for suture removal. She reports modest improvement in symptoms- mainly the ulnar neuropathy. She complains of neck and low back pain. Her neck has had a \"crick\" in it for a few weeks. She was recommended to do a \"ligament release\" at her pain clinic.       Past Medical History:   Diagnosis Date    Anemia     Anesthesia complication     elevated BP after surgeries    Anxiety and depression     Arthritis     Bipolar 1 disorder     Diabetes mellitus     Disease of thyroid gland     nodule on L lobe, PCP monitoring    GERD (gastroesophageal reflux disease)     Heart murmur     History of kidney stones     History of positive PPD     clear chest x-rays (unable to take meds r/t liver intolerance)    Hyperlipidemia     Hypertension     Panic attacks     Pneumonia     last incident 2020    PONV (postoperative nausea and vomiting)     in the past but not recently     Rheumatic fever     as a child     Sleep apnea     waiting on CPAP to be delivered    UTI (urinary tract infection)     chronic, takes macrobid daily, last UTI ~         Current Outpatient Medications:     ALPRAZolam (XANAX) 0.5 MG tablet, Take 1 tablet by mouth Every 12 (Twelve) Hours., Disp: , Rfl:     aspirin 81 MG chewable tablet, Chew 1 tablet Daily., Disp: , Rfl:     cyclobenzaprine (FLEXERIL) 10 MG tablet, TAKE 1 TABLET BY MOUTH THREE " TIMES A DAY AS NEEDED FOR MUSCLE SPASMS (Patient taking differently: Take 1 tablet by mouth 3 (Three) Times a Day As Needed for Muscle Spasms.), Disp: 60 tablet, Rfl: 0    Diclofenac Sodium (VOLTAREN) 1 % gel gel, Apply 4 g topically to the appropriate area as directed 2 (Two) Times a Day As Needed (neck pain)., Disp: , Rfl:     estradiol (ESTRACE) 2 MG tablet, Take 1 tablet by mouth Daily., Disp: , Rfl:     Farxiga 10 MG tablet, Take 10 mg by mouth Daily., Disp: , Rfl:     fluticasone (FLONASE) 50 MCG/ACT nasal spray, 2 sprays into the nostril(s) as directed by provider Daily As Needed for Allergies., Disp: , Rfl:     gabapentin (NEURONTIN) 800 MG tablet, Take 1 tablet by mouth 3 (Three) Times a Day., Disp: , Rfl:     hydroCHLOROthiazide (HYDRODIURIL) 25 MG tablet, , Disp: , Rfl:     HYDROcodone-acetaminophen (NORCO) 7.5-325 MG per tablet, Take 1 tablet by mouth Every 8 (Eight) Hours As Needed for Moderate Pain ., Disp: 15 tablet, Rfl: 0    hydrOXYzine (ATARAX) 25 MG tablet, , Disp: , Rfl:     insulin glargine (LANTUS) 100 UNIT/ML injection, Inject 55 Units under the skin into the appropriate area as directed Every Morning., Disp: , Rfl:     insulin glargine (LANTUS, SEMGLEE) 100 UNIT/ML injection, Inject 50 Units under the skin into the appropriate area as directed Every Night., Disp: , Rfl:     linaclotide (LINZESS) 145 MCG capsule capsule, Take 1 capsule by mouth Daily As Needed (constipation)., Disp: , Rfl:     lisinopril (PRINIVIL,ZESTRIL) 40 MG tablet, Take 1 tablet by mouth Daily., Disp: , Rfl:     loratadine (CLARITIN) 10 MG tablet, Take 1 tablet by mouth Daily., Disp: , Rfl:     meclizine (ANTIVERT) 25 MG tablet, , Disp: , Rfl:     melatonin 5 MG tablet tablet, Take 2 tablets by mouth At Night As Needed., Disp: , Rfl:     metFORMIN (GLUCOPHAGE) 1000 MG tablet, Take 1 tablet by mouth 2 (Two) Times a Day With Meals., Disp: , Rfl:     methocarbamol (ROBAXIN) 500 MG tablet, , Disp: , Rfl:     montelukast  (SINGULAIR) 10 MG tablet, Take 1 tablet by mouth Every Night., Disp: , Rfl:     naproxen (NAPROSYN) 375 MG tablet, Take 1 tablet by mouth 2 (Two) Times a Day As Needed for Mild Pain., Disp: , Rfl:     nitrofurantoin (MACRODANTIN) 50 MG capsule, Take 1 capsule by mouth Daily. Chronic UTIs, takes daily, Disp: , Rfl:     NovoLOG FlexPen 100 UNIT/ML solution pen-injector sc pen, Inject 8-16 Units under the skin into the appropriate area as directed 3 (Three) Times a Day With Meals., Disp: , Rfl:     omeprazole (priLOSEC) 40 MG capsule, Take 1 capsule by mouth Daily., Disp: , Rfl:     ondansetron (ZOFRAN) 4 MG tablet, , Disp: , Rfl:     ondansetron ODT (ZOFRAN-ODT) 4 MG disintegrating tablet, Place 1 tablet on the tongue Every 8 (Eight) Hours As Needed for Nausea or Vomiting., Disp: , Rfl:     OXcarbazepine (TRILEPTAL) 300 MG tablet, , Disp: , Rfl:     rosuvastatin (CRESTOR) 20 MG tablet, Take 1 tablet by mouth Every Night., Disp: , Rfl:     TRUEplus Lancets 33G misc, , Disp: , Rfl:     Trulicity 0.75 MG/0.5ML solution pen-injector, Inject 0.75 mg under the skin into the appropriate area as directed 1 (One) Time Per Week., Disp: , Rfl:     venlafaxine XR (EFFEXOR-XR) 150 MG 24 hr capsule, Take 1 capsule by mouth 2 (Two) Times a Day., Disp: , Rfl:     Past Surgical History:   Procedure Laterality Date    ANTERIOR CERVICAL DISCECTOMY W/ FUSION N/A 3/9/2020    Procedure: CERVICAL DISCECTOMY ANTERIOR WITH FUSION C5-7;  Surgeon: Jairo White MD;  Location:  [facility];  Service: Neurosurgery;  Laterality: N/A;    APPENDECTOMY      CARPAL TUNNEL RELEASE Left 2021    Procedure: CARPAL TUNNEL RELEASE LEFT;  Surgeon: Jairo White MD;  Location:  [facility];  Service: Neurosurgery;  Laterality: Left;    CARPAL TUNNEL RELEASE Right 2023    Procedure: CARPAL TUNNEL RELEASE - RIGHT;  Surgeon: Jairo White MD;  Location: [facility];  Service: Neurosurgery;  Laterality: Right;     SECTION      x4     "CHOLECYSTECTOMY      HEMORRHOIDECTOMY      HYSTERECTOMY      TONSILLECTOMY      ULNAR NERVE TRANSPOSITION Right 7/13/2023    Procedure: ULNAR NERVE DECOMPRESSION- RIGHT;  Surgeon: Jairo White MD;  Location: [facility];  Service: Neurosurgery;  Laterality: Right;       Social History     Socioeconomic History    Marital status:    Tobacco Use    Smoking status: Never    Smokeless tobacco: Never   Vaping Use    Vaping Use: Never used   Substance and Sexual Activity    Alcohol use: No    Drug use: No    Sexual activity: Defer         Review of Systems    Objective   Vital Signs: Blood pressure 126/76, temperature 97.1 °F (36.2 °C), temperature source Infrared, height 167.6 cm (66\"), weight 103 kg (227 lb), not currently breastfeeding.  Physical Exam  Vitals and nursing note reviewed.   Constitutional:       General: She is not in acute distress.     Appearance: She is well-developed.   HENT:      Head: Normocephalic and atraumatic.   Skin:     Comments: Nylon sutures were removed in a clean fashion from right wrist incision. Incision remained intact. No drainage. Mild swelling. Right elbow incision is intact and healing nicely without concern for infection.    Psychiatric:         Behavior: Behavior normal.         Thought Content: Thought content normal.     Assessment & Plan   Diagnosis:  1.  Ulnar neuropathy status post ulnar nerve decompression at the elbow on the, right  2.  Right carpal tunnel syndrome status post right carpal tunnel release    Medical Decision Making: Sutures were removed and further wound care instructions were discussed. Patient should continue to observe improvements over the next few months. I reviewed signs/symptoms of infection which would warrant a follow up visit.     Diagnoses and all orders for this visit:    1. Right carpal tunnel syndrome (Primary)    2. Ulnar neuropathy at elbow, right    3. Cervical radiculopathy                          Dianne Dickinson, " KATHY  Patient Care Team:  Susie Reeder MD as PCP - General (Internal Medicine)  Stella Nice APRN as Referring Physician (Family Medicine)  Monique Rubio APRN as Referring Physician

## 2024-06-17 ENCOUNTER — TELEPHONE (OUTPATIENT)
Dept: NEUROSURGERY | Facility: CLINIC | Age: 64
End: 2024-06-17
Payer: MEDICARE

## 2024-06-17 DIAGNOSIS — Z98.1 HISTORY OF FUSION OF CERVICAL SPINE: Primary | ICD-10-CM

## 2024-06-17 DIAGNOSIS — M50.10 HERNIATION OF CERVICAL INTERVERTEBRAL DISC WITH RADICULOPATHY: ICD-10-CM

## 2024-06-17 NOTE — TELEPHONE ENCOUNTER
Provider:     Caller: PATIENT    Relationship to Patient: SELF    Pharmacy: [facility]    Phone Number: [phone]    Reason for Call: PT CALLED AND STATES THAT SHE HAD CERVICAL SURGERY BACK IN 2021 WITH -PT STATES THAT FOR THE LAST 3 WEEKS TO A MONTH SHE HAS BEEN HAVING PAIN IN HER NECK WHEN SHE TRIES TO HOLD HER HEAD UP-PT STATES THAT SHE HAS A CHOKING FEELING DEPENDING ON HOW SHE GOES TO  LAY DOWN OR TURNS A CERTAIN WAY-PT STATES SHE FEELS LIKE IT IS ON THE INSIDE-PT STATES THAT SHE FEELS LIKE IT IS HARD FOR HER TO SWALLOW OR BREATH SOMETIMES-PT STATES THAT SHE FEELS THE PAIN ALL THE WAY TO HER LEFT SHOULDER BLADE AND SOMETIMES IN THE RIGHT-PT STATES THAT SHE HAS BEEN HAVING TO LAY DOWN AND NOT BE AS ACTIVE-PT STATES THAT SHE CAN NOT WASH HER HAIR DUE TO THE PAIN-PT STATES THAT WHEN SHE STANDS WITHIN A MOMENT SHE FEELS VERY DIZZY TO WHERE SHE FEELS AS IF SHE WILL PASS OUT-PT STATES THAT SHE IS NOT ABLE TO SIT UP FOR A LONG TIME DUE TO THE SEVERE PAIN-PT STATES THAT SHE HAS NOT BEEN ABLE TO SHOWER WITHOUT ASSISTANCE-PT STATES THAT THE WEIGHT OF WATER IN HER HAIR CAUSES PAIN-PT STATES THAT SHE HAS NOT HAD ANY IMAGING    When was the patient last seen: LAST SEEN 07/26/23 FOR DIFF. DX    When did it start: 3 WEEKS TO A MONTH    Where is it located: NECK, SHOULDER BLADES AND BOTH SHOULDERS    Characteristics of symptom/severity: AT THE WORST STEADY 8     Timing- Is it constant or intermittent: CONSTANT OVER THE LAST FEW WEEKS    What makes it worse: STANDING, MOVING, TURNING HER NECK,SHOWERING EVEN SITTING    What makes it better:LAYING ON HER BACK AS LONG AS SHE CAN ADJUST TO A POSITION THAT IS COMFORTABLE    What therapies/medications have you tried: MUSCLE RELAXER'S MAKE HER SLEEPY

## 2024-06-17 NOTE — TELEPHONE ENCOUNTER
Provider:  Christopher  Surgery/Procedure:  ACDF C5-7  Surgery/Procedure Date:  3/9/2020  Last visit:   7/26/23  Next visit: NA     Reason for call:  Patient reports in the last several weeks she has had gradually worsening neck pain - denies any accidents or exacerbating events. She states that it is even difficult to hold her head up at times, the pain is so severe. Pain does radiate into both shoulders, with the left being worse than the right, and into the upper arm and bicep. She reports the neck and shoulder pain feels very similar to what she had prior to surgery in 2020. She has some slight numbness and tingling in her hands but this is baseline, she has had carpal tunnel surgery on both sides. She has no weakness, but does feel that she is more wobbly and unsteady on her feet lately. Lastly, she reports depending on the positioning of her neck, that at times when she turns or moves her neck she has a pressure sensation originating from the posterior neck/spine that wraps around her esophagus and causes her to feel short of breath and at times choke.     Last c-spine MRI in 2021 - MRI Cervical Spine Without Contrast (05/14/2021 11:58)

## 2024-06-19 ENCOUNTER — OFFICE VISIT (OUTPATIENT)
Dept: NEUROSURGERY | Facility: CLINIC | Age: 64
End: 2024-06-19
Payer: MEDICARE

## 2024-06-19 VITALS — HEIGHT: 64 IN | WEIGHT: 220 LBS | TEMPERATURE: 95.5 F | BODY MASS INDEX: 37.56 KG/M2

## 2024-06-19 DIAGNOSIS — M25.519 NECK AND SHOULDER PAIN: ICD-10-CM

## 2024-06-19 DIAGNOSIS — M48.02 CERVICAL SPINAL STENOSIS: Primary | ICD-10-CM

## 2024-06-19 DIAGNOSIS — Z01.818 PRE-OP TESTING: Primary | ICD-10-CM

## 2024-06-19 DIAGNOSIS — E10.9 INSULIN DEPENDENT DIABETES MELLITUS TYPE IA: ICD-10-CM

## 2024-06-19 DIAGNOSIS — M54.2 NECK AND SHOULDER PAIN: ICD-10-CM

## 2024-06-19 DIAGNOSIS — Z98.1 HISTORY OF FUSION OF CERVICAL SPINE: ICD-10-CM

## 2024-06-19 RX ORDER — METHOCARBAMOL 750 MG/1
750 TABLET, FILM COATED ORAL 4 TIMES DAILY PRN
Qty: 45 TABLET | Refills: 0 | Status: SHIPPED | OUTPATIENT
Start: 2024-06-19

## 2024-06-19 RX ORDER — SEMAGLUTIDE 0.68 MG/ML
0.25 INJECTION, SOLUTION SUBCUTANEOUS
COMMUNITY
Start: 2024-03-04

## 2024-06-19 RX ORDER — METHYLPREDNISOLONE 4 MG/1
TABLET ORAL
Qty: 21 TABLET | Refills: 0 | Status: SHIPPED | OUTPATIENT
Start: 2024-06-19

## 2024-06-19 NOTE — PROGRESS NOTES
"Subjective     Chief Complaint: severe neck and shoulder pain                                Hand tingling                               Low back pain    Patient ID: Medina Rider is a 64 y.o. female seen for consultation today     Back Pain  Pertinent negatives include no abdominal pain, chest pain, dysuria, fever, headaches, numbness or weakness.   Neck Pain   Pertinent negatives include no chest pain, fever, headaches, numbness, photophobia, trouble swallowing or weakness.       History of Present Illness  Ms Rider is a 65 yo F who is well known to our practice. In 2020, she had a C5-7 ACDF for neck pain with severe neck and arm pain as well as cord compromise. She did well from that procedure, but also has had peripheral nerve disease with bilateral carpal tunnel releases in 2021 and ulnar nerve releases in 2023.   About 3 weeks ago, she began having severe pain in her neck and into her shoulder. Pain is intolerable any time she is standing or sitting holding her head up. She gets some relief lying down with her head resting on a pillow. Pain will course into her shoulders bilaterally and into the muscles of her neck, which will make her feel \"like I'm choking\". She has not had a fall or injury and does not recall any inciting event other than possibly picking up a case of water.    She noes any pressure on her head, even in the shower, or the weight of her hair when wet makes the pain worse. She denies weakness or falls, she has not had bowel or bladder dysfunction. She describes some dizziness when she sits or stands from lying down and some \"spotty\" vision for a few seconds when transitioning. She has been taking muscle relaxers with no relief. She has chronic pain and has hydrocodone from pain management. This is also not helping her current discomfort.     She has a history of HTN, diabetes (insulin dependent), COPD, Anxiety, obesity, and general deconditioning     The following portions of the " patient's history were reviewed and updated as appropriate: allergies, current medications, past family history, past medical history, past social history, past surgical history and problem list.    Past Medical History:   Diagnosis Date    Anemia     Anesthesia complication     elevated BP after surgeries    Anxiety and depression     Arthritis     Bipolar 1 disorder     Diabetes mellitus     Disease of thyroid gland     nodule on L lobe, PCP monitoring    GERD (gastroesophageal reflux disease)     Heart murmur     History of kidney stones     History of positive PPD     clear chest x-rays (unable to take meds r/t liver intolerance)    Hyperlipidemia     Hypertension     Panic attacks     Pneumonia     last incident 2020    PONV (postoperative nausea and vomiting)     in the past but not recently     Rheumatic fever     as a child     Sleep apnea     waiting on CPAP to be delivered    UTI (urinary tract infection)     chronic, takes macrobid daily, last UTI ~      Past Surgical History:   Procedure Laterality Date    ANTERIOR CERVICAL DISCECTOMY W/ FUSION N/A 3/9/2020    Procedure: CERVICAL DISCECTOMY ANTERIOR WITH FUSION C5-7;  Surgeon: Jairo White MD;  Location:  [facility];  Service: Neurosurgery;  Laterality: N/A;    APPENDECTOMY      CARPAL TUNNEL RELEASE Left 2021    Procedure: CARPAL TUNNEL RELEASE LEFT;  Surgeon: Jairo White MD;  Location:  [facility];  Service: Neurosurgery;  Laterality: Left;    CARPAL TUNNEL RELEASE Right 2023    Procedure: CARPAL TUNNEL RELEASE - RIGHT;  Surgeon: Jairo White MD;  Location:  [facility];  Service: Neurosurgery;  Laterality: Right;     SECTION      x4    CHOLECYSTECTOMY      HEMORRHOIDECTOMY      HYSTERECTOMY      TONSILLECTOMY      ULNAR NERVE TRANSPOSITION Right 2023    Procedure: ULNAR NERVE DECOMPRESSION- RIGHT;  Surgeon: Jairo White MD;  Location:  [facility];  Service: Neurosurgery;  Laterality: Right;      Family history:    Family History   Problem Relation Age of Onset    Heart disease Mother     Diabetes Father     Heart disease Father     Heart disease Sister     Cancer Brother        Social history:   Social History     Socioeconomic History    Marital status:    Tobacco Use    Smoking status: Never    Smokeless tobacco: Never   Vaping Use    Vaping status: Never Used   Substance and Sexual Activity    Alcohol use: No    Drug use: No    Sexual activity: Defer       Review of Systems   Constitutional:  Negative for activity change, appetite change, chills, diaphoresis, fatigue, fever and unexpected weight change.   HENT:  Negative for congestion, dental problem, drooling, ear discharge, ear pain, facial swelling, hearing loss, mouth sores, nosebleeds, postnasal drip, rhinorrhea, sinus pressure, sinus pain, sneezing, sore throat, tinnitus, trouble swallowing and voice change.    Eyes:  Negative for photophobia, pain, discharge, redness, itching and visual disturbance.   Respiratory:  Negative for apnea, cough, choking, chest tightness, shortness of breath, wheezing and stridor.    Cardiovascular:  Negative for chest pain, palpitations and leg swelling.   Gastrointestinal:  Negative for abdominal distention, abdominal pain, anal bleeding, blood in stool, constipation, diarrhea, nausea, rectal pain and vomiting.   Endocrine: Negative for cold intolerance, heat intolerance, polydipsia, polyphagia and polyuria.   Genitourinary:  Negative for decreased urine volume, difficulty urinating, dysuria, enuresis, flank pain, frequency, genital sores, hematuria and urgency.   Musculoskeletal:  Positive for back pain and neck pain. Negative for arthralgias, gait problem, joint swelling, myalgias and neck stiffness.   Skin:  Negative for color change, pallor, rash and wound.   Allergic/Immunologic: Negative for environmental allergies, food allergies and immunocompromised state.   Neurological:  Negative for dizziness, tremors, seizures,  "syncope, facial asymmetry, speech difficulty, weakness, light-headedness, numbness and headaches.   Hematological:  Negative for adenopathy. Does not bruise/bleed easily.   Psychiatric/Behavioral:  Negative for agitation, behavioral problems, confusion, decreased concentration, dysphoric mood, hallucinations, self-injury, sleep disturbance and suicidal ideas. The patient is not nervous/anxious and is not hyperactive.    All other systems reviewed and are negative.      Objective   Temperature 95.5 °F (35.3 °C), temperature source Temporal, height 162.6 cm (64\"), weight 99.8 kg (220 lb), not currently breastfeeding.  Body mass index is 37.76 kg/m².  Blood pressure taken lying down was within normal limits    Physical Exam  CONSTITUTIONAL:   - Patient is well-nourished  - Pleasant and appears stated age.  -she is lying on the exam table and is clearly in pain and mild distress  - she is anxious  PSYCHIATRIC:  - Anxious and upset  - Behavior is normal.  HENT:   Head: Normocephalic and Atraumatic.   Eyes:     - Pupils are equal, round, and reactive to light.     - EOM are normal.   CV:   - Regular rate and rhythm on palpable radial pulse   PULMONARY:   - Speaking in full sentences, breathing non labored  - No wheezing   SKIN:  - Clean, dry and intact   MUSCULOSKELETAL:  - Neck and  to palpation    - Strength i4/5 in upper extremities but patient endorses severe neck pain with movement against pressure.  intact bilaterally. Lower extremity strength 4/5 and equal bilaterally  - Muscle tone generally reduced throughout.  - patient not ambulated  - ROM in neck is severely pain limited  NEUROLOGICAL:  - A&Ox3  - Attention span, language function, and cognition are intact.  - Sensation is intact to light touch testing throughout except for mild numbness on tip of left index finger   - Deep tendon reflexes are 2++ and symmetrical.    - Hurtado's Sign is negative bilaterally.  - No clonus is elicited.  - " Coordination is intact.     Patient's Body mass index is 37.76 kg/m². indicating that she is morbidly obese (BMI > 40 or > 35 with obesity - related health      Independent Review of Radiographic Studies:    AP and lateral xrays of the cervical spine were reviewed today in the office. This shows good placement of fusion hardware and completed fusion from C5-7 with no complications. These images were reviewed with Dr. White as well.     Assessment & Plan   Ms Rider is in severe pain. She has had stenosis with radiculopathy in the past and states that this feels similar. She is unable to do any ADLs without severe discomfort. There is no evidence of hardware complication of her previous fusion. She is not weak or hyperreflexic. We will obtain a stat MRI of the cervical spine and see her back in the office ASAP to review these results.   I will also try a different muscle relaxer and a medrol dose pack to see if we can get her pain under some control.    Diagnoses and all orders for this visit:    1. Cervical spinal stenosis (Primary)  -     MRI Cervical Spine With & Without Contrast; Future    2. History of fusion of cervical spine  -     MRI Cervical Spine With & Without Contrast; Future    3. Insulin dependent diabetes mellitus type IA    4. Neck and shoulder pain  -     MRI Cervical Spine With & Without Contrast; Future    Other orders  -     methocarbamol (ROBAXIN) 750 MG tablet; Take 1 tablet by mouth 4 (Four) Times a Day As Needed for Muscle Spasms.  Dispense: 45 tablet; Refill: 0  -     methylPREDNISolone (MEDROL) 4 MG dose pack; Take as directed on package instructions.  Dispense: 21 tablet; Refill: 0        Return in about 2 days (around 6/21/2024).           This document signed by Jade Quigley PA-C June 19, 2024 13:15 EDT

## 2024-06-21 ENCOUNTER — OFFICE VISIT (OUTPATIENT)
Dept: NEUROSURGERY | Facility: CLINIC | Age: 64
End: 2024-06-21
Payer: MEDICARE

## 2024-06-21 VITALS
SYSTOLIC BLOOD PRESSURE: 132 MMHG | HEIGHT: 64 IN | DIASTOLIC BLOOD PRESSURE: 74 MMHG | WEIGHT: 212 LBS | BODY MASS INDEX: 36.19 KG/M2

## 2024-06-21 DIAGNOSIS — E10.9 INSULIN DEPENDENT DIABETES MELLITUS TYPE IA: ICD-10-CM

## 2024-06-21 DIAGNOSIS — F41.9 ANXIETY AND DEPRESSION: Primary | ICD-10-CM

## 2024-06-21 DIAGNOSIS — Z98.1 HISTORY OF FUSION OF CERVICAL SPINE: ICD-10-CM

## 2024-06-21 DIAGNOSIS — R53.81 PHYSICAL DECONDITIONING: ICD-10-CM

## 2024-06-21 DIAGNOSIS — R51.9 LEFT FACIAL PAIN: ICD-10-CM

## 2024-06-21 DIAGNOSIS — M48.02 CERVICAL SPINAL STENOSIS: ICD-10-CM

## 2024-06-21 DIAGNOSIS — F32.A ANXIETY AND DEPRESSION: Primary | ICD-10-CM

## 2024-06-21 DIAGNOSIS — R42 ORTHOSTATIC DIZZINESS: ICD-10-CM

## 2024-06-21 DIAGNOSIS — R13.10 DYSPHAGIA, UNSPECIFIED TYPE: ICD-10-CM

## 2024-06-21 DIAGNOSIS — E66.8 MODERATE OBESITY: ICD-10-CM

## 2024-06-21 DIAGNOSIS — M51.37 DEGENERATION OF LUMBAR OR LUMBOSACRAL INTERVERTEBRAL DISC: ICD-10-CM

## 2024-06-21 DIAGNOSIS — M25.519 NECK AND SHOULDER PAIN: ICD-10-CM

## 2024-06-21 DIAGNOSIS — M54.2 NECK AND SHOULDER PAIN: ICD-10-CM

## 2024-06-21 NOTE — PROGRESS NOTES
"Subjective     Chief Complaint: severe neck and shoulder pain                                Hand tingling                               Low back pain                                Left sided face and jaw pain         Patient ID: Medina Rider is a 64 y.o. female is here today for follow-up.    History of Present Illness  Ms Rider is a 65 yo F who is well known to our practice. In 2020, she had a C5-7 ACDF for neck pain with severe neck and arm pain as well as cord compromise. She did well from that procedure, but also has had peripheral nerve disease with bilateral carpal tunnel releases in 2021 and ulnar nerve releases in 2023.   About 3 weeks ago, she began having severe pain in her neck and into her shoulders, especially on the left. Pain is intolerable any time she is standing or sitting holding her head up. She gets some relief lying down with her head resting on a pillow. Pain will course into her shoulders bilaterally and into the muscles of her neck, which will make her feel \"like I'm choking\". She also complains of posterior ascending headache and left jaw pain. She is having frequent spasms in her low back as well. Overall, she is quite miserable.      She has not had a fall or injury and does not recall any inciting event other than possibly picking up a case of water.    She noes any pressure on her head, even in the shower, or the weight of her hair when wet makes the pain worse. She denies weakness or falls, she has not had bowel or bladder dysfunction. She describes some dizziness when she sits or stands from lying down and some \"spotty\" vision for a few seconds when transitioning. Her PCP has recommended cardiology follow up.   She has been taking muscle relaxers with no relief. She has chronic pain and has hydrocodone from pain management. This is also not helping her current discomfort.   She was seen in the office on Wednesday, 6/19 for these complaints. Xray of the cervical spine did " "not show any hardware complications. She was started on a steroid pack and a muscle relaxer. She is established with pain management and takes hydrocodone BID which has not relieved her symptoms.   She denies fever or chills. She has pain \"all over\". She   She was sent for a stat MRI of her cervical spine and is here today to review this      She has a history of HTN, diabetes (insulin dependent), COPD, Anxiety, obesity, and general deconditioning    The following portions of the patient's history were reviewed and updated as appropriate: allergies, current medications, past family history, past medical history, past social history, past surgical history and problem list.    Family history:   Family History   Problem Relation Age of Onset    Heart disease Mother     Diabetes Father     Heart disease Father     Heart disease Sister     Cancer Brother        Social history:   Social History     Socioeconomic History    Marital status:    Tobacco Use    Smoking status: Never    Smokeless tobacco: Never   Vaping Use    Vaping status: Never Used   Substance and Sexual Activity    Alcohol use: No    Drug use: No    Sexual activity: Defer       Review of Systems  Denies fever or chills (\"I stay hot\"),   positive for Headaches, left facial and jaw pain, neck pain, \"choking\" sensation, dizziness, shoulder pain L>R and severe low back muscle spasms.   No fever, chest pain (though she does endorse some left sided chest wall pain under her left breast), SOA, abdominal pain or constipation, bowel or bladder dysfunction     Objective   Blood pressure 132/74, height 162.6 cm (64\"), weight 96.2 kg (212 lb), not currently breastfeeding.  Body mass index is 36.39 kg/m².    Physical Exam  CONSTITUTIONAL:   - Patient is well-nourished  - Pleasant and appears stated age.  -she is lying on the exam table and is clearly in pain and mild distress  - she is anxious  PSYCHIATRIC:  - Anxious and upset  - Behavior is normal.  HENT: "   Head: Normocephalic and Atraumatic.   Eyes:     - Pupils are equal, round, and reactive to light.     - EOM are normal.   CV:   - Regular rate and rhythm on palpable radial pulse   -dizziness when going from lying down to standing. Needs to wait several seconds before she is able to start walking to the wheelchair   PULMONARY:   - Speaking in full sentences, breathing non labored  - No wheezing   SKIN:  - Clean, dry and intact   MUSCULOSKELETAL:  - Neck and  to palpation    - Strength i4/5 in upper extremities but patient endorses severe neck pain with movement against pressure.  intact bilaterally. Lower extremity strength 4/5 and equal bilaterally  - Muscle tone generally reduced throughout.  - patient is able to take a few steps with assistance. She complains of dizziness and severe low back muscle spasms.   - ROM in neck is severely pain limited  NEUROLOGICAL:  - A&Ox3  - Attention span, language function, and cognition are intact.  - Sensation is intact to light touch testing throughout except for mild numbness on tip of left index finger   - Deep tendon reflexes are 2++ and symmetrical.    - Hurtado's Sign is negative bilaterally.  - No clonus is elicited.  - Coordination is intact.     Patient's Body mass index is 36.39 kg/m². indicating that she is morbidly obese (BMI > 40 or > 35 with obesity - related health    Independent Review of Radiographic Studies:    MRI of the cervical spine done today, 6/21/24 was reviewed. This shows hardware from C5-7. AT C5/6 there is a disc/osteophyte complex resulting in some spinal stenosis and foraminal stenosis, but this is stable to a previous MRI done here dated 5/14/21. The MRI was reviewed with Dr. White as well and with the patient. The radiology read from [facility] notes no obvious paraspinal mass or fluid collection.     Assessment & Plan   Unfortuately, I am at a loss to explain Ms. Rider's multiple complaints. Her cervical fusion and  repeat MRI are stable and not indicative of any new spinal or foraminal stenosis. She is not myelopathic on exam. \]  She does have complaints of facial pain, jaw pain, and swallowing issues and I will order a head CT for completeness which she may do in the next several days.   She has complaints today of some left sided rib pain. She has also had dizziness upon standing. I agree that she should follow up with cardiology as soon as possible.  There is no neurological surgery needed for her cervical complaints. MRI of the lumbar spine from 2021 shows some moderate stenosis, but she does not have radicular symptoms or complaints of claudication. Dr White recommends following up with her pain management doctor and with her PCP for her various concerns. We will see her in our office on an as-needed basis.       Diagnoses and all orders for this visit:    1. Anxiety and depression (Primary)    2. Moderate obesity    3. Cervical spinal stenosis    4. Degeneration of lumbar or lumbosacral intervertebral disc    5. History of fusion of cervical spine    6. Left facial pain  -     CT Head Without Contrast; Future    7. Neck and shoulder pain  -     CT Head Without Contrast; Future    8. Orthostatic dizziness    9. Physical deconditioning    10. Insulin dependent diabetes mellitus type IA    11. Dysphagia, unspecified type  -     CT Head Without Contrast; Future    Other orders  -     MRI outside films; Future        Return if symptoms worsen or fail to improve.    Jade Quigley PA-C

## 2024-07-09 NOTE — TELEPHONE ENCOUNTER
Provider:  Dann  Surgery/Procedure:  Carpal tunnel release and ulnar nerve decompression right side  Surgery/Procedure Date:  7/13/23  Last visit:   6/21/24  Next visit: NA     Reason for call:  Patient called for results of CT head - she had this done at an outside facility so I have  a request in for that to be power shared to us. She also would like a refill of methocarbamol if able.    We do at least have the report of the CT Head - IMAGING SCANNED (07/02/2024)

## 2024-07-10 RX ORDER — METHOCARBAMOL 750 MG/1
750 TABLET, FILM COATED ORAL 4 TIMES DAILY PRN
Qty: 45 TABLET | Refills: 0 | Status: SHIPPED | OUTPATIENT
Start: 2024-07-10

## 2024-07-10 NOTE — TELEPHONE ENCOUNTER
I reviewed the CT scan. There are no abnormalities to explain her facial and jaw pain or her swallowing issues.   I am happy to refill her muscle relaxer. She will need to follow up with her pain management and PCP

## (undated) DEVICE — CVR HNDL LT SURG ACCSSRY BLU STRL

## (undated) DEVICE — INSTRUMENT 7080902 PLATE HOLDING PIN

## (undated) DEVICE — STRAP POSTN KN/BDY FM 5X72IN DISP

## (undated) DEVICE — SYR CONTRL LUERLOK 10CC

## (undated) DEVICE — NDL HYPO ECLPS SFTY 25G 1 1/2IN

## (undated) DEVICE — INSTRUMENT 875-088 14MM DSTRCT PIN STRL: Brand: MEDTRONIC REUSABLE INSTRUMENT

## (undated) DEVICE — NEEDLE, QUINCKE, 20GX3.5": Brand: MEDLINE

## (undated) DEVICE — 3M™ MEDIPORE™ H SOFT CLOTH SURGICAL TAPE, 2863, 3 IN X 10 YD, 12/CASE: Brand: 3M™ MEDIPORE™

## (undated) DEVICE — SUT VIC PLS CTD ANTIB BR 3/0 8/18IN 45CM

## (undated) DEVICE — DRSNG TELFA PAD NONADH STR 1S 3X8IN

## (undated) DEVICE — 3M™ WARMING BLANKET, LOWER BODY, 10 PER CASE, 42568: Brand: BAIR HUGGER™

## (undated) DEVICE — GLV SURG SIGNATURE TOUCH PF LTX 7.5 STRL

## (undated) DEVICE — 3M™ STERI-DRAPE™ INSTRUMENT POUCH 1018: Brand: STERI-DRAPE™

## (undated) DEVICE — GLV SURG PREMIERPRO MIC LTX PF SZ7.5 BRN

## (undated) DEVICE — BANDAGE,GAUZE,BULKEE II,4.5"X4.1YD,STRL: Brand: MEDLINE

## (undated) DEVICE — ACCY PA700 LUBRICANT DIFFUSER MR7 4 PACK: Brand: MIDAS REX

## (undated) DEVICE — PK NEURO DISC 10

## (undated) DEVICE — DRP MICROSCOP ELIMINATES GLAS COVR

## (undated) DEVICE — SKIN AFFIX SURG ADHESIVE 72/CS 0.55ML: Brand: MEDLINE

## (undated) DEVICE — SUT SILK 2/0 TIES 18IN A185H

## (undated) DEVICE — UNDERGLV SURG BIOGEL INDICAT PI SZ8 BLU

## (undated) DEVICE — GLV SURG PREMIERPRO MIC LTX PF SZ6.5 BRN

## (undated) DEVICE — BLD KNIF KARLIN 46 3178

## (undated) DEVICE — CONTN SHRP CHEMO 2GL

## (undated) DEVICE — ANTIBACTERIAL UNDYED BRAIDED (POLYGLACTIN 910), SYNTHETIC ABSORBABLE SUTURE: Brand: COATED VICRYL

## (undated) DEVICE — Device

## (undated) DEVICE — KITTNER SPONGE: Brand: DEROYAL

## (undated) DEVICE — ELECTRD BLD EDGE/INSUL1P 2.4X5.1MM STRL

## (undated) DEVICE — TOOL 12BA30 LEGEND 12CM 3MM BALL: Brand: MIDAS REX

## (undated) DEVICE — DRILL BIT 7080513 STERILE 13MM

## (undated) DEVICE — STCKNT STRL 4X48 IN

## (undated) DEVICE — DISPOSABLE BIPOLAR FORCEPS 5 1/2" (14CM) SEMKIN, 0.7MM TIP AND 12 FT. (3.6M) CABLE: Brand: KIRWAN

## (undated) DEVICE — PK EXTREM UPPR 10

## (undated) DEVICE — SNAP KOVER: Brand: UNBRANDED